# Patient Record
Sex: FEMALE | Race: WHITE | NOT HISPANIC OR LATINO | URBAN - METROPOLITAN AREA
[De-identification: names, ages, dates, MRNs, and addresses within clinical notes are randomized per-mention and may not be internally consistent; named-entity substitution may affect disease eponyms.]

---

## 2018-04-02 ENCOUNTER — EMERGENCY (EMERGENCY)
Facility: HOSPITAL | Age: 23
LOS: 0 days | Discharge: HOME | End: 2018-04-02
Attending: EMERGENCY MEDICINE

## 2018-04-02 VITALS
RESPIRATION RATE: 17 BRPM | TEMPERATURE: 97 F | DIASTOLIC BLOOD PRESSURE: 92 MMHG | HEART RATE: 85 BPM | OXYGEN SATURATION: 99 % | SYSTOLIC BLOOD PRESSURE: 136 MMHG

## 2018-04-02 VITALS
HEART RATE: 100 BPM | SYSTOLIC BLOOD PRESSURE: 142 MMHG | RESPIRATION RATE: 20 BRPM | OXYGEN SATURATION: 98 % | TEMPERATURE: 97 F | DIASTOLIC BLOOD PRESSURE: 92 MMHG

## 2018-04-02 DIAGNOSIS — R07.89 OTHER CHEST PAIN: ICD-10-CM

## 2018-04-02 DIAGNOSIS — R06.02 SHORTNESS OF BREATH: ICD-10-CM

## 2018-04-02 DIAGNOSIS — R53.1 WEAKNESS: ICD-10-CM

## 2018-04-02 DIAGNOSIS — I10 ESSENTIAL (PRIMARY) HYPERTENSION: ICD-10-CM

## 2018-04-02 LAB
ALBUMIN SERPL ELPH-MCNC: 4.6 G/DL — SIGNIFICANT CHANGE UP (ref 3.5–5.2)
ALP SERPL-CCNC: 59 U/L — SIGNIFICANT CHANGE UP (ref 30–115)
ALT FLD-CCNC: 35 U/L — SIGNIFICANT CHANGE UP (ref 0–41)
ANION GAP SERPL CALC-SCNC: 16 MMOL/L — HIGH (ref 7–14)
APPEARANCE UR: (no result)
AST SERPL-CCNC: 32 U/L — SIGNIFICANT CHANGE UP (ref 0–41)
BACTERIA # UR AUTO: (no result) /HPF
BASE EXCESS BLDV CALC-SCNC: -0.5 MMOL/L — SIGNIFICANT CHANGE UP (ref -2–2)
BASOPHILS # BLD AUTO: 0.02 K/UL — SIGNIFICANT CHANGE UP (ref 0–0.2)
BASOPHILS NFR BLD AUTO: 0.2 % — SIGNIFICANT CHANGE UP (ref 0–1)
BILIRUB SERPL-MCNC: 0.7 MG/DL — SIGNIFICANT CHANGE UP (ref 0.2–1.2)
BILIRUB UR-MCNC: NEGATIVE — SIGNIFICANT CHANGE UP
BUN SERPL-MCNC: 6 MG/DL — LOW (ref 10–20)
CA-I SERPL-SCNC: 1.35 MMOL/L — HIGH (ref 1.12–1.3)
CALCIUM SERPL-MCNC: 10.4 MG/DL — HIGH (ref 8.5–10.1)
CHLORIDE SERPL-SCNC: 99 MMOL/L — SIGNIFICANT CHANGE UP (ref 98–110)
CO2 SERPL-SCNC: 21 MMOL/L — SIGNIFICANT CHANGE UP (ref 17–32)
COLOR SPEC: YELLOW — SIGNIFICANT CHANGE UP
CREAT SERPL-MCNC: 0.7 MG/DL — SIGNIFICANT CHANGE UP (ref 0.7–1.5)
D DIMER BLD IA.RAPID-MCNC: 130 NG/ML DDU — SIGNIFICANT CHANGE UP (ref 0–230)
DIFF PNL FLD: NEGATIVE — SIGNIFICANT CHANGE UP
EOSINOPHIL # BLD AUTO: 0.21 K/UL — SIGNIFICANT CHANGE UP (ref 0–0.7)
EOSINOPHIL NFR BLD AUTO: 1.6 % — SIGNIFICANT CHANGE UP (ref 0–8)
EPI CELLS # UR: (no result) /HPF
GAS PNL BLDV: 137 MMOL/L — SIGNIFICANT CHANGE UP (ref 136–145)
GAS PNL BLDV: SIGNIFICANT CHANGE UP
GLUCOSE SERPL-MCNC: 72 MG/DL — SIGNIFICANT CHANGE UP (ref 70–99)
GLUCOSE UR QL: NEGATIVE MG/DL — SIGNIFICANT CHANGE UP
HCG SERPL-ACNC: HIGH MIU/ML (ref 0–5)
HCO3 BLDV-SCNC: 24 MMOL/L — SIGNIFICANT CHANGE UP (ref 22–29)
HCT VFR BLD CALC: 40.3 % — SIGNIFICANT CHANGE UP (ref 37–47)
HCT VFR BLDA CALC: 43.2 % — SIGNIFICANT CHANGE UP (ref 34–44)
HGB BLD CALC-MCNC: 14.1 G/DL — SIGNIFICANT CHANGE UP (ref 14–18)
HGB BLD-MCNC: 13.3 G/DL — SIGNIFICANT CHANGE UP (ref 12–16)
IMM GRANULOCYTES NFR BLD AUTO: 0.6 % — HIGH (ref 0.1–0.3)
KETONES UR-MCNC: 40
LACTATE BLDV-MCNC: 0.8 MMOL/L — SIGNIFICANT CHANGE UP (ref 0.5–1.6)
LEUKOCYTE ESTERASE UR-ACNC: (no result)
LYMPHOCYTES # BLD AUTO: 17.9 % — LOW (ref 20.5–51.1)
LYMPHOCYTES # BLD AUTO: 2.29 K/UL — SIGNIFICANT CHANGE UP (ref 1.2–3.4)
MAGNESIUM SERPL-MCNC: 1.9 MG/DL — SIGNIFICANT CHANGE UP (ref 1.8–2.4)
MCHC RBC-ENTMCNC: 28.1 PG — SIGNIFICANT CHANGE UP (ref 27–31)
MCHC RBC-ENTMCNC: 33 G/DL — SIGNIFICANT CHANGE UP (ref 32–37)
MCV RBC AUTO: 85.2 FL — SIGNIFICANT CHANGE UP (ref 81–99)
MONOCYTES # BLD AUTO: 0.65 K/UL — HIGH (ref 0.1–0.6)
MONOCYTES NFR BLD AUTO: 5.1 % — SIGNIFICANT CHANGE UP (ref 1.7–9.3)
NEUTROPHILS # BLD AUTO: 9.53 K/UL — HIGH (ref 1.4–6.5)
NEUTROPHILS NFR BLD AUTO: 74.6 % — SIGNIFICANT CHANGE UP (ref 42.2–75.2)
NITRITE UR-MCNC: NEGATIVE — SIGNIFICANT CHANGE UP
NRBC # BLD: 0 /100 WBCS — SIGNIFICANT CHANGE UP (ref 0–0)
PCO2 BLDV: 38 MMHG — LOW (ref 41–51)
PH BLDV: 7.41 — SIGNIFICANT CHANGE UP (ref 7.26–7.43)
PH UR: 6 — SIGNIFICANT CHANGE UP (ref 5–8)
PLATELET # BLD AUTO: 254 K/UL — SIGNIFICANT CHANGE UP (ref 130–400)
PO2 BLDV: 36 MMHG — SIGNIFICANT CHANGE UP (ref 20–40)
POTASSIUM BLDV-SCNC: 3.7 MMOL/L — SIGNIFICANT CHANGE UP (ref 3.3–5.6)
POTASSIUM SERPL-MCNC: 4 MMOL/L — SIGNIFICANT CHANGE UP (ref 3.5–5)
POTASSIUM SERPL-SCNC: 4 MMOL/L — SIGNIFICANT CHANGE UP (ref 3.5–5)
PROT SERPL-MCNC: 7.4 G/DL — SIGNIFICANT CHANGE UP (ref 6–8)
PROT UR-MCNC: NEGATIVE MG/DL — SIGNIFICANT CHANGE UP
RBC # BLD: 4.73 M/UL — SIGNIFICANT CHANGE UP (ref 4.2–5.4)
RBC # FLD: 13.7 % — SIGNIFICANT CHANGE UP (ref 11.5–14.5)
SAO2 % BLDV: 70 % — SIGNIFICANT CHANGE UP
SODIUM SERPL-SCNC: 136 MMOL/L — SIGNIFICANT CHANGE UP (ref 135–146)
SP GR SPEC: 1.01 — SIGNIFICANT CHANGE UP (ref 1.01–1.03)
TROPONIN T SERPL-MCNC: <0.01 NG/ML — SIGNIFICANT CHANGE UP
UROBILINOGEN FLD QL: 0.2 MG/DL — SIGNIFICANT CHANGE UP (ref 0.2–0.2)
WBC # BLD: 12.78 K/UL — HIGH (ref 4.8–10.8)
WBC # FLD AUTO: 12.78 K/UL — HIGH (ref 4.8–10.8)
WBC UR QL: (no result) /HPF

## 2018-04-02 RX ORDER — ONDANSETRON 8 MG/1
8 TABLET, FILM COATED ORAL ONCE
Qty: 0 | Refills: 0 | Status: COMPLETED | OUTPATIENT
Start: 2018-04-02 | End: 2018-04-02

## 2018-04-02 RX ORDER — LABETALOL HCL 100 MG
1 TABLET ORAL
Qty: 60 | Refills: 0 | OUTPATIENT
Start: 2018-04-02 | End: 2018-05-01

## 2018-04-02 RX ORDER — SODIUM CHLORIDE 9 MG/ML
1000 INJECTION, SOLUTION INTRAVENOUS ONCE
Qty: 0 | Refills: 0 | Status: COMPLETED | OUTPATIENT
Start: 2018-04-02 | End: 2018-04-02

## 2018-04-02 RX ORDER — LABETALOL HCL 100 MG
100 TABLET ORAL ONCE
Qty: 0 | Refills: 0 | Status: COMPLETED | OUTPATIENT
Start: 2018-04-02 | End: 2018-04-02

## 2018-04-02 RX ADMIN — ONDANSETRON 8 MILLIGRAM(S): 8 TABLET, FILM COATED ORAL at 15:32

## 2018-04-02 RX ADMIN — SODIUM CHLORIDE 3000 MILLILITER(S): 9 INJECTION, SOLUTION INTRAVENOUS at 15:32

## 2018-04-02 RX ADMIN — Medication 100 MILLIGRAM(S): at 18:19

## 2018-04-02 NOTE — ED ADULT NURSE NOTE - OBJECTIVE STATEMENT
pt recently diagnosed with htn, not yet on medication. 14 weeks pregnant. c/o chest discomfort, sob, nausea, dizziness this morning, not feeling a little better. pt reports bp 215/105 at home

## 2018-04-02 NOTE — ED PROVIDER NOTE - OBJECTIVE STATEMENT
21 y/o F PMH of HTN (currently being worked up by her GYN), , 14 weeks pregnant, presents to hospital for episode of dizziness and palpitations this morning. Pt checked her BP at this time which was reportedly 201/115. Pt also reports associated nausea, 1 episode of NBNB vomiting, and SOB. SX now resolved in ED. Pt states this also happened to her last week. Denies HX of DVT’s and family HX of pregnancy complications. No abdominal pain or vaginal bleeding. No leg swelling. GYN Dr. Miller.

## 2018-04-02 NOTE — ED PROVIDER NOTE - PHYSICAL EXAMINATION
VITAL SIGNS: I have reviewed nursing notes and confirm.  CONSTITUTIONAL: Alert, NAD, WDWN, NCAT, non-toxic appearing.  SKIN: Warm/dry, no rash.  HEAD: NCAT.   EYES: PERRL, EOMI, normal pupils, no icterus.  ENT: Normal external ENT, pink/moist membranes, pharynx normal, airway intact.  NECK: Supple; non tender.  CARD: CVS1S2, RRR, no m/g/r, no JVD, 2+ pulses b/l.   RESP: Normal respiratory effort, speaking full sentences, lungs CTA b/l.  ABD: Soft, no tender/dist/guard/rebound, no CVA tenderness.  EXT: FROM all 4 ext, no alonzo tender/deform.  LYMPH: No acute cervical adenopathy.  NEURO: AAOx3, CN 2-12 intact, normal motor, normal sensory, normal gait.  PSYCH: Cooperative, appropriate.

## 2018-04-02 NOTE — ED ADULT TRIAGE NOTE - CHIEF COMPLAINT QUOTE
Pt with on and off of episode od dizziness hear palpitation weakness from today in AM ,pt 14 weeks pregnant .

## 2018-04-02 NOTE — ED PROVIDER NOTE - NS ED ROS FT
Constitutional: No fever/chills.  Eyes: No visual changes, eye pain or discharge.  ENMT: No hearing changes, pain, discharge or infections.  Cardiac: (+) Chest pain. (+) SOB. No edema.  Respiratory: No cough or respiratory distress.   GI: (+) Vomiting. No diarrhea or abdominal pain.  : No dysuria, frequency or burning.  MS: No myalgia, muscle weakness, joint pain or back pain.  Neuro: (+) Dizziness. No headache or weakness. No LOC.  Skin: No skin rash.   Except as documented in the HPI, all other systems are negative.

## 2018-04-02 NOTE — ED PROVIDER NOTE - CARE PLAN
Principal Discharge DX:	Hypertension, unspecified type  Secondary Diagnosis:	Chest pain, unspecified type

## 2018-08-30 ENCOUNTER — OUTPATIENT (OUTPATIENT)
Dept: OUTPATIENT SERVICES | Facility: HOSPITAL | Age: 23
LOS: 1 days | Discharge: HOME | End: 2018-08-30

## 2018-08-30 VITALS — DIASTOLIC BLOOD PRESSURE: 95 MMHG | SYSTOLIC BLOOD PRESSURE: 129 MMHG | HEART RATE: 85 BPM

## 2018-08-30 VITALS — HEART RATE: 96 BPM | DIASTOLIC BLOOD PRESSURE: 92 MMHG | SYSTOLIC BLOOD PRESSURE: 130 MMHG

## 2018-08-30 NOTE — OB PROVIDER TRIAGE NOTE - NSHPPHYSICALEXAM_GEN_ALL_CORE
Vital Signs Last 24 Hrs  T(F): 98.6 (30 Aug 2018 09:24), Max: 98.6 (30 Aug 2018 09:24)  HR: 88 (30 Aug 2018 10:19) (88 - 96)  BP: 126/81 (30 Aug 2018 10:19) (126/81 - 133/94)  RR: 18 (30 Aug 2018 09:24) (18 - 18)    udip neg  Gen: NAD  CVS: RRR. normal S1, S2  Lungs: CTAB  Abd: soft, non tender, no R/G/R, no epigastric or RUQ tenderness  Neuro: reflexes 2+ in upper extremities B/L  LE: 1+ edema  SVE closed  neg nitrazine, ferning pooling  /mod/+accels  [place] none

## 2018-08-30 NOTE — OB PROVIDER TRIAGE NOTE - NSOBPROVIDERNOTE_OBGYN_ALL_OB_FT
23 yo  at 34w6d, CHTN on labetalol, not ruptured, not in labor. Fetal at maternal status reassuring. 21 yo  at 34w6d, CHTN on labetalol, not ruptured, not in labor. Fetal at maternal status reassuring.  -Discharge home  -PTL precuations given  -PEL precautions given  -[facility] instructed,   -f/u Appt on     Dr. Larkin and Dr. Miller aware

## 2018-08-30 NOTE — OB PROVIDER TRIAGE NOTE - ADDITIONAL INSTRUCTIONS
-Discharge home  -PTL precuations given  -PEL precautions given  -[facility] instructed,   -f/u Appt on 9/5

## 2018-08-30 NOTE — OB PROVIDER TRIAGE NOTE - HISTORY OF PRESENT ILLNESS
23 yo  at 34w6d with ARMANDO 10/5 by LMP c/w first trimester sono presents with LOF at 0700, clear. Reports she had 2 more small gushes, clear, unsure if urine. Not currently leaking. Reports camping, no painful ctx, VB. Good FM. CHTN discovered early this pregnancy, on labetalol 100mg BID since then, no changes to medications. BPs at home 120-130s/70s-80s. Denies headaache, vision changes, CP, SOb, abdominal pain, LE swelling. Also on aspirin 81mg x2 at bedtime. Denies other complications during this pregnancy. Last intercourse 3 days ago, Last BM this morning, normal. Denies fevers, chills, headache, CP, SOB, abd pain, dysuria.     Meds last taken last night, no labetalol taken this AM.    obhx: primigravid  Gyn: L ovarian cyst, no intervention. Otherwise denies GYn hx

## 2018-09-19 PROBLEM — I10 ESSENTIAL (PRIMARY) HYPERTENSION: Chronic | Status: ACTIVE | Noted: 2018-08-30

## 2018-09-19 PROBLEM — Z00.00 ENCOUNTER FOR PREVENTIVE HEALTH EXAMINATION: Status: ACTIVE | Noted: 2018-09-19

## 2018-09-21 ENCOUNTER — APPOINTMENT (OUTPATIENT)
Dept: ANTEPARTUM | Facility: CLINIC | Age: 23
End: 2018-09-21

## 2018-09-21 ENCOUNTER — RESULT CHARGE (OUTPATIENT)
Age: 23
End: 2018-09-21

## 2018-09-21 ENCOUNTER — OUTPATIENT (OUTPATIENT)
Dept: OUTPATIENT SERVICES | Facility: HOSPITAL | Age: 23
LOS: 1 days | Discharge: HOME | End: 2018-09-21

## 2018-09-21 VITALS
HEIGHT: 67 IN | DIASTOLIC BLOOD PRESSURE: 85 MMHG | HEART RATE: 93 BPM | SYSTOLIC BLOOD PRESSURE: 132 MMHG | WEIGHT: 239.6 LBS | BODY MASS INDEX: 37.61 KG/M2 | TEMPERATURE: 98.4 F | OXYGEN SATURATION: 97 %

## 2018-09-21 DIAGNOSIS — Z86.79 PERSONAL HISTORY OF OTHER DISEASES OF THE CIRCULATORY SYSTEM: ICD-10-CM

## 2018-09-21 DIAGNOSIS — Z78.9 OTHER SPECIFIED HEALTH STATUS: ICD-10-CM

## 2018-09-21 DIAGNOSIS — O10.919 UNSPECIFIED PRE-EXISTING HYPERTENSION COMPLICATING PREGNANCY, UNSPECIFIED TRIMESTER: ICD-10-CM

## 2018-09-21 DIAGNOSIS — Z34.90 ENCOUNTER FOR SUPERVISION OF NORMAL PREGNANCY, UNSPECIFIED, UNSPECIFIED TRIMESTER: ICD-10-CM

## 2018-09-21 LAB
BILIRUB UR QL STRIP: NORMAL
CLARITY UR: CLEAR
COLLECTION METHOD: NORMAL
FETAL MOVEMENT: PRESENT
GLUCOSE UR-MCNC: NORMAL
HCG UR QL: 0.2 EU/DL
HGB UR QL STRIP.AUTO: NORMAL
KETONES UR-MCNC: NORMAL
LEUKOCYTE ESTERASE UR QL STRIP: NORMAL
NITRITE UR QL STRIP: NORMAL
OB COMMENTS: NORMAL
PH UR STRIP: 6
PROT UR STRIP-MCNC: NORMAL
SCHEDULED VISIT: YES
SP GR UR STRIP: 1.01
URINE ALBUMIN/PROTEIN: NORMAL
URINE GLUCOSE: NORMAL
URINE KETONES: NORMAL

## 2018-09-21 RX ORDER — LABETALOL HYDROCHLORIDE 100 MG/1
100 TABLET, FILM COATED ORAL
Refills: 0 | Status: ACTIVE | COMMUNITY

## 2018-09-24 ENCOUNTER — INPATIENT (INPATIENT)
Facility: HOSPITAL | Age: 23
LOS: 3 days | Discharge: HOME | End: 2018-09-28
Attending: OBSTETRICS & GYNECOLOGY | Admitting: OBSTETRICS & GYNECOLOGY

## 2018-09-24 VITALS — HEART RATE: 88 BPM | SYSTOLIC BLOOD PRESSURE: 135 MMHG | DIASTOLIC BLOOD PRESSURE: 87 MMHG | TEMPERATURE: 98 F

## 2018-09-24 LAB
ALBUMIN SERPL ELPH-MCNC: 3.8 G/DL — SIGNIFICANT CHANGE UP (ref 3.5–5.2)
ALP SERPL-CCNC: 137 U/L — HIGH (ref 30–115)
ALT FLD-CCNC: 13 U/L — SIGNIFICANT CHANGE UP (ref 0–41)
AMPHET UR-MCNC: NEGATIVE — SIGNIFICANT CHANGE UP
ANION GAP SERPL CALC-SCNC: 17 MMOL/L — HIGH (ref 7–14)
APPEARANCE UR: CLEAR — SIGNIFICANT CHANGE UP
AST SERPL-CCNC: 19 U/L — SIGNIFICANT CHANGE UP (ref 0–41)
BARBITURATES UR SCN-MCNC: NEGATIVE — SIGNIFICANT CHANGE UP
BASOPHILS # BLD AUTO: 0.02 K/UL — SIGNIFICANT CHANGE UP (ref 0–0.2)
BASOPHILS NFR BLD AUTO: 0.2 % — SIGNIFICANT CHANGE UP (ref 0–1)
BENZODIAZ UR-MCNC: NEGATIVE — SIGNIFICANT CHANGE UP
BILIRUB SERPL-MCNC: 0.6 MG/DL — SIGNIFICANT CHANGE UP (ref 0.2–1.2)
BILIRUB UR-MCNC: NEGATIVE — SIGNIFICANT CHANGE UP
BLD GP AB SCN SERPL QL: SIGNIFICANT CHANGE UP
BUN SERPL-MCNC: 10 MG/DL — SIGNIFICANT CHANGE UP (ref 10–20)
CALCIUM SERPL-MCNC: 10.5 MG/DL — HIGH (ref 8.5–10.1)
CHLORIDE SERPL-SCNC: 99 MMOL/L — SIGNIFICANT CHANGE UP (ref 98–110)
CO2 SERPL-SCNC: 21 MMOL/L — SIGNIFICANT CHANGE UP (ref 17–32)
COCAINE METAB.OTHER UR-MCNC: NEGATIVE — SIGNIFICANT CHANGE UP
COLOR SPEC: YELLOW — SIGNIFICANT CHANGE UP
CREAT ?TM UR-MCNC: 94 MG/DL — SIGNIFICANT CHANGE UP
CREAT SERPL-MCNC: 0.7 MG/DL — SIGNIFICANT CHANGE UP (ref 0.7–1.5)
DIFF PNL FLD: NEGATIVE — SIGNIFICANT CHANGE UP
EOSINOPHIL # BLD AUTO: 0.1 K/UL — SIGNIFICANT CHANGE UP (ref 0–0.7)
EOSINOPHIL NFR BLD AUTO: 1 % — SIGNIFICANT CHANGE UP (ref 0–8)
GLUCOSE SERPL-MCNC: 58 MG/DL — LOW (ref 70–99)
GLUCOSE UR QL: NEGATIVE MG/DL — SIGNIFICANT CHANGE UP
HCT VFR BLD CALC: 32 % — LOW (ref 37–47)
HGB BLD-MCNC: 10.7 G/DL — LOW (ref 12–16)
HIV 1 & 2 AB SERPL IA.RAPID: SIGNIFICANT CHANGE UP
IMM GRANULOCYTES NFR BLD AUTO: 0.3 % — SIGNIFICANT CHANGE UP (ref 0.1–0.3)
KETONES UR-MCNC: NEGATIVE — SIGNIFICANT CHANGE UP
LDH SERPL L TO P-CCNC: 175 — SIGNIFICANT CHANGE UP (ref 50–242)
LEUKOCYTE ESTERASE UR-ACNC: NEGATIVE — SIGNIFICANT CHANGE UP
LYMPHOCYTES # BLD AUTO: 2.12 K/UL — SIGNIFICANT CHANGE UP (ref 1.2–3.4)
LYMPHOCYTES # BLD AUTO: 21.5 % — SIGNIFICANT CHANGE UP (ref 20.5–51.1)
MCHC RBC-ENTMCNC: 29.5 PG — SIGNIFICANT CHANGE UP (ref 27–31)
MCHC RBC-ENTMCNC: 33.4 G/DL — SIGNIFICANT CHANGE UP (ref 32–37)
MCV RBC AUTO: 88.2 FL — SIGNIFICANT CHANGE UP (ref 81–99)
METHADONE UR-MCNC: NEGATIVE — SIGNIFICANT CHANGE UP
MONOCYTES # BLD AUTO: 0.71 K/UL — HIGH (ref 0.1–0.6)
MONOCYTES NFR BLD AUTO: 7.2 % — SIGNIFICANT CHANGE UP (ref 1.7–9.3)
NEUTROPHILS # BLD AUTO: 6.86 K/UL — HIGH (ref 1.4–6.5)
NEUTROPHILS NFR BLD AUTO: 69.8 % — SIGNIFICANT CHANGE UP (ref 42.2–75.2)
NITRITE UR-MCNC: NEGATIVE — SIGNIFICANT CHANGE UP
NRBC # BLD: 0 /100 WBCS — SIGNIFICANT CHANGE UP (ref 0–0)
OPIATES UR-MCNC: NEGATIVE — SIGNIFICANT CHANGE UP
PCP SPEC-MCNC: SIGNIFICANT CHANGE UP
PH UR: 6 — SIGNIFICANT CHANGE UP (ref 5–8)
PLATELET # BLD AUTO: 184 K/UL — SIGNIFICANT CHANGE UP (ref 130–400)
POTASSIUM SERPL-MCNC: 4.4 MMOL/L — SIGNIFICANT CHANGE UP (ref 3.5–5)
POTASSIUM SERPL-SCNC: 4.4 MMOL/L — SIGNIFICANT CHANGE UP (ref 3.5–5)
PRENATAL SYPHILIS TEST: SIGNIFICANT CHANGE UP
PROPOXYPHENE QUALITATIVE URINE RESULT: NEGATIVE — SIGNIFICANT CHANGE UP
PROT ?TM UR-MCNC: 12 MG/DLG/24H — SIGNIFICANT CHANGE UP
PROT SERPL-MCNC: 6.5 G/DL — SIGNIFICANT CHANGE UP (ref 6–8)
PROT UR-MCNC: NEGATIVE MG/DL — SIGNIFICANT CHANGE UP
PROT/CREAT UR-RTO: 0.1 RATIO — SIGNIFICANT CHANGE UP (ref 0–0.2)
RBC # BLD: 3.63 M/UL — LOW (ref 4.2–5.4)
RBC # FLD: 14 % — SIGNIFICANT CHANGE UP (ref 11.5–14.5)
SODIUM SERPL-SCNC: 137 MMOL/L — SIGNIFICANT CHANGE UP (ref 135–146)
SP GR SPEC: 1.01 — SIGNIFICANT CHANGE UP (ref 1.01–1.03)
TYPE + AB SCN PNL BLD: SIGNIFICANT CHANGE UP
URATE SERPL-MCNC: 6.4 MG/DL — SIGNIFICANT CHANGE UP (ref 2.5–7)
UROBILINOGEN FLD QL: 0.2 MG/DL — SIGNIFICANT CHANGE UP (ref 0.2–0.2)
WBC # BLD: 9.84 K/UL — SIGNIFICANT CHANGE UP (ref 4.8–10.8)
WBC # FLD AUTO: 9.84 K/UL — SIGNIFICANT CHANGE UP (ref 4.8–10.8)

## 2018-09-24 RX ORDER — DINOPROSTONE 10 MG/241MG
10 INSERT VAGINAL ONCE
Qty: 0 | Refills: 0 | Status: COMPLETED | OUTPATIENT
Start: 2018-09-24 | End: 2018-09-24

## 2018-09-24 RX ORDER — SODIUM CHLORIDE 9 MG/ML
125 INJECTION, SOLUTION INTRAVENOUS ONCE
Qty: 0 | Refills: 0 | Status: DISCONTINUED | OUTPATIENT
Start: 2018-09-24 | End: 2018-09-26

## 2018-09-24 RX ORDER — OXYTOCIN 10 UNIT/ML
41.67 VIAL (ML) INJECTION
Qty: 20 | Refills: 0 | Status: DISCONTINUED | OUTPATIENT
Start: 2018-09-24 | End: 2018-09-26

## 2018-09-24 RX ORDER — SODIUM CHLORIDE 9 MG/ML
1000 INJECTION, SOLUTION INTRAVENOUS
Qty: 0 | Refills: 0 | Status: DISCONTINUED | OUTPATIENT
Start: 2018-09-24 | End: 2018-09-26

## 2018-09-24 RX ORDER — LABETALOL HCL 100 MG
100 TABLET ORAL
Qty: 0 | Refills: 0 | Status: DISCONTINUED | OUTPATIENT
Start: 2018-09-24 | End: 2018-09-28

## 2018-09-24 RX ADMIN — Medication 100 MILLIGRAM(S): at 22:09

## 2018-09-24 RX ADMIN — DINOPROSTONE 10 MILLIGRAM(S): 10 INSERT VAGINAL at 19:14

## 2018-09-24 NOTE — OB PROVIDER H&P - NS_VBACATTEMPT_OBGYN_ALL_OB
Pt arrives with c/o new onset seizures that were witnessed. Pt is not post ictal at this time.       Hortencia Burris RN  06/03/18 1116     N/A

## 2018-09-24 NOTE — OB PROVIDER H&P - ASSESSMENT
23 yo  38w3d, GBS neg per patient, IOL for chronic hypertension.     -Admit  -Admission labs  -IVF  -Pain management prn  -Continuous EFM/toco  -Monitor BPs q15min  -PEL    Dr. Larkin and Dr. Miller to be made aware. 21 yo  38w3d, GBS neg per patient, IOL for chronic hypertension with cervidil.     -Cervidil  -Admit  -Admission labs  -IVF  -Pain management prn  -Continuous EFM/toco  -Monitor BPs q15min  -PEL    Dr. Larkin and Dr. Miller to be made aware.

## 2018-09-24 NOTE — PROGRESS NOTE ADULT - SUBJECTIVE AND OBJECTIVE BOX
PGY2 Note    Patient seen at bedside for evaluation of labor progression, with Cervidil in at 1915, doing well, no complaints.     T(F): 98.8 ( @ 17:54), Max: 98.8 ( @ 17:54)  HR: 97 ( @ 23:01)  BP: 138/68 ( @ 23:01) (124/65 - 150/79)  RR: 18 ( @ 17:54)  EFM: 120/mod/+accels  TOCO: q 1-4min irregular  SVE: c/l/p , Intact    Medications:  dinoprostone Insert: 10 ( @ 19:14)  labetalol: 100 ( @ 22:09)      Labs:                        10.7   9.84  )-----------( 184      ( 24 Sep 2018 18:24 )             32.0         137  |  99  |  10  ----------------------------<  58<L>  4.4   |  21  |  0.7    Ca    10.5<H>      24 Sep 2018 18:24    TPro  6.5  /  Alb  3.8  /  TBili  0.6  /  DBili  x   /  AST  19  /  ALT  13  /  AlkPhos  137<H>      Prenatal Syphilis Test: Nonreact ( @ 18:24)  Rapid HIV-1/2 Antibody: Nonreact ( @ 18:24)  Drug Screen W/PCP, Urine: Done ( @ 17:58)  Uric Acid, Serum: 6.4 mg/dL ( @ 18:24)  Type + Screen: A POS (18 @ 18:24)  Antibody Screen: NEG (18 @ 18:24)    Urinalysis Basic - ( 24 Sep 2018 17:58 )    Color: Yellow / Appearance: Clear / S.015 / pH: x  Gluc: x / Ketone: Negative  / Bili: Negative / Urobili: 0.2 mg/dL   Blood: x / Protein: Negative mg/dL / Nitrite: Negative   Leuk Esterase: Negative / RBC: x / WBC x   Sq Epi: x / Non Sq Epi: x / Bacteria: x PGY2 Note    Patient seen at bedside for evaluation of labor progression, with Cervidil in at 1915, doing well, no complaints.     T(F): 98.8 ( @ 17:54), Max: 98.8 ( @ 17:54)  HR: 97 ( @ 23:01)  BP: 138/68 ( @ 23:01) (124/65 - 150/79)  RR: 18 ( @ 17:54)  EFM: 120/mod/+accels  TOCO: q 1-4min irregular  SVE:Deferred. Last exam: c/l/p , Intact @1750    Medications:  dinoprostone Insert: 10 ( @ 19:14)  labetalol: 100 ( @ 22:09)      Labs:                        10.7   9.84  )-----------( 184      ( 24 Sep 2018 18:24 )             32.0         137  |  99  |  10  ----------------------------<  58<L>  4.4   |  21  |  0.7    Ca    10.5<H>      24 Sep 2018 18:24    TPro  6.5  /  Alb  3.8  /  TBili  0.6  /  DBili  x   /  AST  19  /  ALT  13  /  AlkPhos  137<H>      Prenatal Syphilis Test: Nonreact ( @ 18:24)  Rapid HIV-1/2 Antibody: Nonreact ( @ 18:24)  Drug Screen W/PCP, Urine: Done ( @ 17:58)  Uric Acid, Serum: 6.4 mg/dL ( @ 18:24)  Type + Screen: A POS (18 @ 18:24)  Antibody Screen: NEG (18 @ 18:24)    Urinalysis Basic - ( 24 Sep 2018 17:58 )    Color: Yellow / Appearance: Clear / S.015 / pH: x  Gluc: x / Ketone: Negative  / Bili: Negative / Urobili: 0.2 mg/dL   Blood: x / Protein: Negative mg/dL / Nitrite: Negative   Leuk Esterase: Negative / RBC: x / WBC x   Sq Epi: x / Non Sq Epi: x / Bacteria: x

## 2018-09-24 NOTE — OB PROVIDER H&P - NSHPPHYSICALEXAM_GEN_ALL_CORE
Vital Signs Last 24 Hrs  T(C): 37.1 (24 Sep 2018 17:54), Max: 37.1 (24 Sep 2018 17:54)  T(F): 98.8 (24 Sep 2018 17:54), Max: 98.8 (24 Sep 2018 17:54)  HR: 83 (24 Sep 2018 18:05) (83 - 88)  BP: 135/87 (24 Sep 2018 18:05) (135/87 - 135/87)  RR: 18 (24 Sep 2018 17:54) (18 - 18)    Udip: neg  EFM: 125/mod/accel +  [place]: irregular  SVE: deferred, last one at the office today, C/L/P and vertex by sono  Abd: NT, gravid, no palpable contractions

## 2018-09-24 NOTE — OB PROVIDER H&P - HISTORY OF PRESENT ILLNESS
23 yo  38w3d w/ ARMANDO of 10/05/2018 by LMP consistent with 1st trimester sono here for IOL for gestational hypertension. Denies contractions, LOF and VB. Feels good FM. No complications in this pregnancy other than gestational hypertension. Last PMD visit was today and her cervical exam was C/L/P. Pt takes labetalol 100 mg bid for her hypertensive disorder, BPs well controlled with it but PMD was concerned that BPs were rising towards the end of the pregnancy. 23 yo  38w3d w/ ARMANDO of 10/05/2018 by LMP consistent with 1st trimester sono here for IOL for chronic hypertension. Denies contractions, LOF and VB. Feels good FM. No complications in this pregnancy other than gestational hypertension. Last PMD visit was today and her cervical exam was C/L/P. Pt takes labetalol 100 mg bid for her hypertensive disorder, BPs well controlled with it but PMD was concerned that BPs were rising towards the end of the pregnancy. 21 yo  38w3d w/ ARMANDO of 10/05/2018 by LMP consistent with 1st trimester sono here for IOL for chronic hypertension. Denies contractions, LOF and VB. Feels good FM. No complications in this pregnancy other than chronic hypertension. Last PMD visit was today and her cervical exam was C/L/P. Pt takes labetalol 100 mg bid for her hypertensive disorder, BPs well controlled with it but PMD was concerned that BPs were rising towards the end of the pregnancy.

## 2018-09-24 NOTE — PROGRESS NOTE ADULT - ASSESSMENT
22y  at 38w3d, GBS neg, with cHTN on Labetolol 100mg BID, for IOL with Cervidil, doing well    -Continuous efm and toco  -F/u pending labs (UDS)  -IV hydration and clear liquid diet  -Pain management PRN  -Cervidil out at 0715    Dr. Doyle aware, Dr. Miller to be made aware

## 2018-09-24 NOTE — OB PROVIDER H&P - NSHPLABSRESULTS_GEN_ALL_CORE
GCT: 157  Sono:   36w4d: EFW 2997 grams (69%), MVP 5.23cm, cephalic, anterior placenta, umbilical artery S/D 71%ile GCT: 157  Sono:   36w4d: EFW 2997 grams (69%), MVP 5.23cm, cephalic, anterior placenta

## 2018-09-25 DIAGNOSIS — O09.93 SUPERVISION OF HIGH RISK PREGNANCY, UNSPECIFIED, THIRD TRIMESTER: ICD-10-CM

## 2018-09-25 DIAGNOSIS — O10.913 UNSPECIFIED PRE-EXISTING HYPERTENSION COMPLICATING PREGNANCY, THIRD TRIMESTER: ICD-10-CM

## 2018-09-25 DIAGNOSIS — Z3A.38 38 WEEKS GESTATION OF PREGNANCY: ICD-10-CM

## 2018-09-25 RX ORDER — DINOPROSTONE 10 MG/241MG
10 INSERT VAGINAL ONCE
Qty: 0 | Refills: 0 | Status: COMPLETED | OUTPATIENT
Start: 2018-09-25 | End: 2018-09-25

## 2018-09-25 RX ORDER — OXYTOCIN 10 UNIT/ML
2 VIAL (ML) INJECTION
Qty: 30 | Refills: 0 | Status: DISCONTINUED | OUTPATIENT
Start: 2018-09-25 | End: 2018-09-25

## 2018-09-25 RX ORDER — OXYTOCIN 10 UNIT/ML
2 VIAL (ML) INJECTION
Qty: 30 | Refills: 0 | Status: DISCONTINUED | OUTPATIENT
Start: 2018-09-25 | End: 2018-09-28

## 2018-09-25 RX ADMIN — DINOPROSTONE 10 MILLIGRAM(S): 10 INSERT VAGINAL at 11:35

## 2018-09-25 RX ADMIN — Medication 100 MILLIGRAM(S): at 06:00

## 2018-09-25 RX ADMIN — Medication 100 MILLIGRAM(S): at 17:59

## 2018-09-25 NOTE — PROGRESS NOTE ADULT - SUBJECTIVE AND OBJECTIVE BOX
PGY1 Note    Patient seen at bedside for evaluation doing well, no complaints    T(F): 98.24 ( @ 19:15), Max: 98.78 ( @ 03:17)  HR: 85 ( @ 20:31)  BP: 135/74 ( @ 20:31) (105/58 - 150/79)  RR: 18 ( @ 03:17)  EFM: 130/mod/+accels  TOCO: q 1-5min  SVE: Deferred. Last exam: /-2 @ 1117    Medications:    dinoprostone Insert: 10 ( @ 11:35)  dinoprostone Insert: 10 ( @ 19:14)  labetalol: 100 ( @ 17:59),  100 ( @ 06:00),  100 ( @ 22:09)      Labs:                        10.7   9.84  )-----------( 184      ( 24 Sep 2018 18:24 )             32.0         137  |  99  |  10  ----------------------------<  58<L>  4.4   |  21  |  0.7    Ca    10.5<H>      24 Sep 2018 18:24    TPro  6.5  /  Alb  3.8  /  TBili  0.6  /  DBili  x   /  AST  19  /  ALT  13  /  AlkPhos  137<H>        Urinalysis Basic - ( 24 Sep 2018 17:58 )    Color: Yellow / Appearance: Clear / S.015 / pH: x  Gluc: x / Ketone: Negative  / Bili: Negative / Urobili: 0.2 mg/dL   Blood: x / Protein: Negative mg/dL / Nitrite: Negative   Leuk Esterase: Negative / RBC: x / WBC x   Sq Epi: x / Non Sq Epi: x / Bacteria: x

## 2018-09-25 NOTE — PROGRESS NOTE ADULT - ASSESSMENT
22y.o.  @ 38.4wks Cervidil IOL for gHTN  - Pt may shower and have breakfast  - Continuous efm and toco  - Dr. Miller aware

## 2018-09-25 NOTE — PROGRESS NOTE ADULT - ASSESSMENT
22y  at 38w4d, GBS neg, with cHTN on Labetolol 100mg BID, for IOL with Cervidil x2, doing well    -Continuous efm and toco  -IV hydration and clear liquid diet  -Pain management PRN  -Cervidil out at 2333    Dr. Doyle aware, Dr. Miller to be made aware

## 2018-09-25 NOTE — PROGRESS NOTE ADULT - SUBJECTIVE AND OBJECTIVE BOX
PGY3 note    Pt seen and examined at bedside by PMD for labor progression, s/p cervidil.  Pt with no complaints at this time.    T(F): 98.78  HR: 82  BP: 106/58  SpO2: --    EFM: 130/mod/+accels  [place]erg[place]: none    SVE: /, vtx, intact per PMD    Meds:  labetalol 100 milliGRAM(s) two times a day  lactated ringers Bolus 125 milliLiter(s) once  lactated ringers. 1000 milliLiter(s) (125 mL/Hr) <Continuous>  oxytocin Infusion 41.667 milliUNIT(s)/Min (125 mL/Hr) <Continuous>      Labs:                        10.7   9.84  )-----------( 184      ( 18:24 )             32.0     Urinalysis Basic - ( 24 Sep 2018 17:58 )    Color: Yellow / Appearance: Clear / S.015 / pH: x  Gluc: x / Ketone: Negative  / Bili: Negative / Urobili: 0.2 mg/dL   Blood: x / Protein: Negative mg/dL / Nitrite: Negative   Leuk Esterase: Negative / RBC: x / WBC x   Sq Epi: x / Non Sq Epi: x / Bacteria: x      23 yo  38w3d GBS ngative, IOL for cHTN, on labetalol 100mg BID, s/p cervidilx1,  -continuous EMF/toco  -pain mgmt prn  -cervidil #2 for continued IOL    Dr Miller aware

## 2018-09-26 RX ORDER — OXYCODONE AND ACETAMINOPHEN 5; 325 MG/1; MG/1
1 TABLET ORAL
Qty: 0 | Refills: 0 | Status: DISCONTINUED | OUTPATIENT
Start: 2018-09-26 | End: 2018-09-28

## 2018-09-26 RX ORDER — CITRIC ACID/SODIUM CITRATE 300-500 MG
30 SOLUTION, ORAL ORAL ONCE
Qty: 0 | Refills: 0 | Status: COMPLETED | OUTPATIENT
Start: 2018-09-26 | End: 2018-09-26

## 2018-09-26 RX ORDER — SIMETHICONE 80 MG/1
80 TABLET, CHEWABLE ORAL EVERY 6 HOURS
Qty: 0 | Refills: 0 | Status: DISCONTINUED | OUTPATIENT
Start: 2018-09-26 | End: 2018-09-28

## 2018-09-26 RX ORDER — DOCUSATE SODIUM 100 MG
100 CAPSULE ORAL
Qty: 0 | Refills: 0 | Status: DISCONTINUED | OUTPATIENT
Start: 2018-09-26 | End: 2018-09-28

## 2018-09-26 RX ORDER — BENZOCAINE 10 %
1 GEL (GRAM) MUCOUS MEMBRANE EVERY 6 HOURS
Qty: 0 | Refills: 0 | Status: DISCONTINUED | OUTPATIENT
Start: 2018-09-26 | End: 2018-09-28

## 2018-09-26 RX ORDER — AER TRAVELER 0.5 G/1
1 SOLUTION RECTAL; TOPICAL EVERY 4 HOURS
Qty: 0 | Refills: 0 | Status: DISCONTINUED | OUTPATIENT
Start: 2018-09-26 | End: 2018-09-28

## 2018-09-26 RX ORDER — FENTANYL/BUPIVACAINE/NS/PF 2MCG/ML-.1
250 PLASTIC BAG, INJECTION (ML) INJECTION
Qty: 0 | Refills: 0 | Status: DISCONTINUED | OUTPATIENT
Start: 2018-09-26 | End: 2018-09-28

## 2018-09-26 RX ORDER — LANOLIN
1 OINTMENT (GRAM) TOPICAL EVERY 6 HOURS
Qty: 0 | Refills: 0 | Status: DISCONTINUED | OUTPATIENT
Start: 2018-09-26 | End: 2018-09-28

## 2018-09-26 RX ORDER — OXYTOCIN 10 UNIT/ML
41.67 VIAL (ML) INJECTION
Qty: 20 | Refills: 0 | Status: DISCONTINUED | OUTPATIENT
Start: 2018-09-26 | End: 2018-09-28

## 2018-09-26 RX ORDER — ACETAMINOPHEN 500 MG
650 TABLET ORAL EVERY 6 HOURS
Qty: 0 | Refills: 0 | Status: DISCONTINUED | OUTPATIENT
Start: 2018-09-26 | End: 2018-09-28

## 2018-09-26 RX ORDER — DIBUCAINE 1 %
1 OINTMENT (GRAM) RECTAL EVERY 4 HOURS
Qty: 0 | Refills: 0 | Status: DISCONTINUED | OUTPATIENT
Start: 2018-09-26 | End: 2018-09-28

## 2018-09-26 RX ORDER — IBUPROFEN 200 MG
600 TABLET ORAL EVERY 6 HOURS
Qty: 0 | Refills: 0 | Status: DISCONTINUED | OUTPATIENT
Start: 2018-09-26 | End: 2018-09-28

## 2018-09-26 RX ORDER — DIPHENHYDRAMINE HCL 50 MG
25 CAPSULE ORAL EVERY 6 HOURS
Qty: 0 | Refills: 0 | Status: DISCONTINUED | OUTPATIENT
Start: 2018-09-26 | End: 2018-09-28

## 2018-09-26 RX ORDER — MAGNESIUM HYDROXIDE 400 MG/1
30 TABLET, CHEWABLE ORAL
Qty: 0 | Refills: 0 | Status: DISCONTINUED | OUTPATIENT
Start: 2018-09-26 | End: 2018-09-28

## 2018-09-26 RX ORDER — GLYCERIN ADULT
1 SUPPOSITORY, RECTAL RECTAL AT BEDTIME
Qty: 0 | Refills: 0 | Status: DISCONTINUED | OUTPATIENT
Start: 2018-09-26 | End: 2018-09-28

## 2018-09-26 RX ADMIN — Medication 2 MILLIUNIT(S)/MIN: at 00:10

## 2018-09-26 RX ADMIN — Medication 100 MILLIGRAM(S): at 06:24

## 2018-09-26 RX ADMIN — Medication 100 MILLIGRAM(S): at 18:15

## 2018-09-26 RX ADMIN — Medication 600 MILLIGRAM(S): at 15:14

## 2018-09-26 RX ADMIN — Medication 30 MILLILITER(S): at 02:59

## 2018-09-26 NOTE — H&P NICU. - NS MD HP NEO PE EXTREM NORMAL
Movement patterns with normal strength and range of motion/Posture, length, shape, position symmetric and appropriate for age/Hips without evidence of dislocation on Zee & Ortalani maneuvers and by gluteal fold patterns

## 2018-09-26 NOTE — OB PROVIDER DELIVERY SUMMARY - NSPROVIDERDELIVERYNOTE_OBGYN_ALL_OB_FT
Patient was fully dilated and pushing. Fetal head was OA and restituted to LOT. The anterior and posterior shoulders delivered, followed by the remaining body atraumatically. Delayed cord clamping was performed, and then clamped and cut. Cord blood gases collected x2. The  was handed to the mother. The placenta delivered intact with membranes. Pitocin was administered. Uterus massaged, fundus found to be firm. Cervix, vagina and perineum inspected 2nd degree and labial lacerations were noted, repaired using 2-0 and 3-0 chromic in the usual fashion with good hemostasis.     Viable male infant delivered, weighing 3200 grams, with APGARs 9/9    Lacteration: 2nd degree and labial   EBL 300cc    Dr. Miller present for the delivery

## 2018-09-26 NOTE — H&P NICU. - NS MD HP NEO PE EYES NORMAL
Pupil red reflexes present and equal/Iris acceptable shape and color/Acceptable eye movement/Conjunctiva clear

## 2018-09-26 NOTE — PROGRESS NOTE ADULT - SUBJECTIVE AND OBJECTIVE BOX
PGY3 Note    Patient seen at bedside for evaluation of labor progression, doing well, no complaints. Sleeping comfortably.    Vital Signs Last 24 Hrs  T(C): 36.9 (26 Sep 2018 02:49), Max: 36.9 (26 Sep 2018 00:15)  T(F): 98.42 (26 Sep 2018 00:15), Max: 98.42 (26 Sep 2018 00:15)  HR: 83 (26 Sep 2018 04:58) (73 - 102)  BP: 97/51 (26 Sep 2018 04:58) (92/53 - 161/72)  EFM: 120/mod/+accels  TOCO: Q2-3mins  SVE: 2/60/-2, vtx, intact    Medications:  citric acid/sodium citrate Solution: 30 milliLiter(s) (18 @ 02:59)  dinoprostone Insert: 10 milliGRAM(s) (18 @ 11:35)  dinoprostone Insert: 10 milliGRAM(s) (18 @ 19:14)  labetalol: 100 milliGRAM(s) (18 @ 17:59),  100 milliGRAM(s) (18 @ 06:00),  100 milliGRAM(s) (18 @ 22:09)  oxytocin Infusion: 2 mL/Hr (18 @ 23:31)      Labs:                        10.7   9.84  )-----------( 184      ( 24 Sep 2018 18:24 )             32.0           137  |  99  |  10  ----------------------------<  58<L>  4.4   |  21  |  0.7    Ca    10.5<H>      24 Sep 2018 18:24    TPro  6.5  /  Alb  3.8  /  TBili  0.6  /  DBili  x   /  AST  19  /  ALT  13  /  AlkPhos  137<H>  -24      Urinalysis Basic - ( 24 Sep 2018 17:58 )    Color: Yellow / Appearance: Clear / S.015 / pH: x  Gluc: x / Ketone: Negative  / Bili: Negative / Urobili: 0.2 mg/dL   Blood: x / Protein: Negative mg/dL / Nitrite: Negative   Leuk Esterase: Negative / RBC: x / WBC x   Sq Epi: x / Non Sq Epi: x / Bacteria: x    UDS negative  A positive, antibody negative  HIV NR

## 2018-09-26 NOTE — PROGRESS NOTE ADULT - ASSESSMENT
22y  at 38w4d, GBS neg, with cHTN on Labetolol 100mg BID, for IOL s/p Cervidil x2, now on pitocin, doing well    - Cont current mgt  - Epidural for pain mgt  - VS [id number]    Dr. Miller to be made aware

## 2018-09-26 NOTE — PROGRESS NOTE ADULT - SUBJECTIVE AND OBJECTIVE BOX
PGY 3 Note    Patient seen at bedside, feeling contractions and desires epidural at this time, doing well otherwise.     Vital Signs Last 24 Hrs  T(C): 36.9 (26 Sep 2018 00:15), Max: 37.1 (25 Sep 2018 03:17)  T(F): 98.42 (26 Sep 2018 00:15), Max: 98.78 (25 Sep 2018 03:17)  HR: 91 (26 Sep 2018 01:51) (73 - 102)  BP: 135/89 (26 Sep 2018 01:51) (105/58 - 161/72)  RR: 18 (25 Sep 2018 03:17) (18 - 18)  EFM: 125/mod/+accels  TOCO: Qirreg	  SVE: Deferred, last VE /-3 at 2320 by Dr. Miller    Medications:  dinoprostone Insert: 10 milliGRAM(s) (18 @ 11:35)  dinoprostone Insert: 10 milliGRAM(s) (18 @ 19:14)  labetalol: 100 milliGRAM(s) (18 @ 17:59),  100 milliGRAM(s) (18 @ 06:00),  100 milliGRAM(s) (18 @ 22:09)  oxytocin Infusion: 2 mL/Hr (18 @ 23:31), now at 8mu/min      Labs:                        10.7   9.84  )-----------( 184      ( 24 Sep 2018 18:24 )             32.0           137  |  99  |  10  ----------------------------<  58<L>  4.4   |  21  |  0.7    Ca    10.5<H>      24 Sep 2018 18:24    TPro  6.5  /  Alb  3.8  /  TBili  0.6  /  DBili  x   /  AST  19  /  ALT  13  /  AlkPhos  137<H>  24      Urinalysis Basic - ( 24 Sep 2018 17:58 )    Color: Yellow / Appearance: Clear / S.015 / pH: x  Gluc: x / Ketone: Negative  / Bili: Negative / Urobili: 0.2 mg/dL   Blood: x / Protein: Negative mg/dL / Nitrite: Negative   Leuk Esterase: Negative / RBC: x / WBC x   Sq Epi: x / Non Sq Epi: x / Bacteria: x    UDS negative  A positive, antibody negative  HIV NR

## 2018-09-26 NOTE — PROGRESS NOTE ADULT - SUBJECTIVE AND OBJECTIVE BOX
PGY2 Note    Patient seen and evaluated at bedside, reporting mild pain with contractions, s/p top off.    T(F): 98.42 ( @ 07:30), Max: 98.6 ( @ 05:05)  HR: 81 ( @ 10:30)  BP: 136/71 ( @ 10:30) (90/53 - 161/72)  EFM: 140/mod/+accels, occ early decels  TOCO: q3min  SVE: deferred, last examined at 0932 by Dr. Miller /-2    Medications:  dinoprostone Insert: 10 ( @ 11:35)  dinoprostone Insert: 10 ( @ 19:14)  labetalol: 100 ( @ 06:24),  100 ( @ 17:59),  100 ( @ 06:00),  100 ( @ 22:09)  oxytocin Infusion: 2 ( @ 23:31)    Labs:                        10.7   9.84  )-----------( 184      ( 24 Sep 2018 18:24 )             32.0         137  |  99  |  10  ----------------------------<  58<L>  4.4   |  21  |  0.7    Ca    10.5<H>      24 Sep 2018 18:24    TPro  6.5  /  Alb  3.8  /  TBili  0.6  /  DBili  x   /  AST  19  /  ALT  13  /  AlkPhos  137<H>      Urinalysis Basic - ( 24 Sep 2018 17:58 )    Color: Yellow / Appearance: Clear / S.015 / pH: x  Gluc: x / Ketone: Negative  / Bili: Negative / Urobili: 0.2 mg/dL   Blood: x / Protein: Negative mg/dL / Nitrite: Negative   Leuk Esterase: Negative / RBC: x / WBC x   Sq Epi: x / Non Sq Epi: x / Bacteria: x    UDS neg

## 2018-09-26 NOTE — H&P NICU. - NS MD HP NEO PE NEURO NORMAL
Jose Angel and grasp reflexes acceptable/Global muscle tone and symmetry normal/Normal suck-swallow patterns for age

## 2018-09-26 NOTE — H&P NICU. - OTHER
Testes descended bilaterally, scrotum symmetric in size, normal urethal opening Asymmetric crying facies - left sided droop

## 2018-09-26 NOTE — H&P NICU. - NS MD HP NEO PE HEART NORMAL
Murmurs absent/Pulse with normal variation, frequency and intensity (amplitude & strength) with equal intensity on upper and lower extremities/Blood pressure value(s) are adequate/PMI and heart sounds localize heart on left side of chest

## 2018-09-26 NOTE — H&P NICU. - ASSESSMENT
Patient is a 38.5 week AGA male infant born via  to 22 yo  mother with history of chronic hypertension treated with labetolol. APGARs 9/9. Mom's prenatals are negative. Mom's blood type is A+. Patient was admitted to high-risk nursery for monitoring of blood glucose measurements in view of maternal labetolol use.     BW- 3200 (41%)   HC- 35 (67%)   L- 51 (67%)  PI- 2.4 (25%-50%)     Plan:   Resp:   - Room air  CVS:   - Hemodynamically stable, monitor HR and BP  FEN:   - Monitor blood glucose as per protocol   - PO Sim advance formula feeds ad trevin   Heme:   - Obtain TC bili at 24 hours of life   ID:   - Monitor temp   GI/:   - Monitor I/Os

## 2018-09-26 NOTE — PROGRESS NOTE ADULT - ASSESSMENT
22y  at 38w5d, GBS neg, with cHTN on Labetolol 100mg BID, for IOL s/p Cervidil x2, now on pitocin, s/p epidural, doing well  -continue pitocin  -pain management prn  -cont efm/toco  -cont to monitor vitals  -cont iv hydration  -re-examine as needed    Dr. Banks aware, will inform Dr. Miller

## 2018-09-26 NOTE — H&P NICU. - NS MD HP NEO PE SKIN NORMAL
Normal patterns of skin texture/Normal patterns of skin perfusion/No rashes/Normal patterns of skin integrity/No signs of meconium exposure/Normal patterns of skin color/Normal patterns of skin vascularity/No eruptions/Normal patterns of skin pigmentation

## 2018-09-26 NOTE — H&P NICU. - NS MD HP NEO PE ABDOMEN NORMAL
Umbilicus with 3 vessels, normal color size and texture/Normal contour/Abdominal wall defects absent/Adequate bowel sound pattern for age/Abdominal distention and masses absent

## 2018-09-26 NOTE — H&P NICU. - NS MD HP NEO PE NECK NORMAL
Without masses/Without webbing/Without redundant skin/Normal and symmetric appearance/Clavicles of normal shape, contour & nontender on palpation

## 2018-09-26 NOTE — PROGRESS NOTE ADULT - ASSESSMENT
22y  at 38w5d, GBS neg, with cHTN on Labetolol 100mg BID, for IOL s/p Cervidil x2, now on pitocin, s/p epidural, doing well    - Cont current mgt  - Pain mgt prn  - VS [id number]    Dr. Miller to be made aware

## 2018-09-27 LAB
BASOPHILS # BLD AUTO: 0.02 K/UL — SIGNIFICANT CHANGE UP (ref 0–0.2)
BASOPHILS NFR BLD AUTO: 0.1 % — SIGNIFICANT CHANGE UP (ref 0–1)
EOSINOPHIL # BLD AUTO: 0.03 K/UL — SIGNIFICANT CHANGE UP (ref 0–0.7)
EOSINOPHIL NFR BLD AUTO: 0.2 % — SIGNIFICANT CHANGE UP (ref 0–8)
HCT VFR BLD CALC: 23.4 % — LOW (ref 37–47)
HCT VFR BLD CALC: 24.3 % — LOW (ref 37–47)
HGB BLD-MCNC: 7.7 G/DL — LOW (ref 12–16)
HGB BLD-MCNC: 7.7 G/DL — LOW (ref 12–16)
IMM GRANULOCYTES NFR BLD AUTO: 0.7 % — HIGH (ref 0.1–0.3)
LYMPHOCYTES # BLD AUTO: 1.79 K/UL — SIGNIFICANT CHANGE UP (ref 1.2–3.4)
LYMPHOCYTES # BLD AUTO: 12.7 % — LOW (ref 20.5–51.1)
MCHC RBC-ENTMCNC: 29.3 PG — SIGNIFICANT CHANGE UP (ref 27–31)
MCHC RBC-ENTMCNC: 29.7 PG — SIGNIFICANT CHANGE UP (ref 27–31)
MCHC RBC-ENTMCNC: 31.7 G/DL — LOW (ref 32–37)
MCHC RBC-ENTMCNC: 32.9 G/DL — SIGNIFICANT CHANGE UP (ref 32–37)
MCV RBC AUTO: 90.3 FL — SIGNIFICANT CHANGE UP (ref 81–99)
MCV RBC AUTO: 92.4 FL — SIGNIFICANT CHANGE UP (ref 81–99)
MONOCYTES # BLD AUTO: 0.79 K/UL — HIGH (ref 0.1–0.6)
MONOCYTES NFR BLD AUTO: 5.6 % — SIGNIFICANT CHANGE UP (ref 1.7–9.3)
NEUTROPHILS # BLD AUTO: 11.32 K/UL — HIGH (ref 1.4–6.5)
NEUTROPHILS NFR BLD AUTO: 80.7 % — HIGH (ref 42.2–75.2)
NRBC # BLD: 0 /100 WBCS — SIGNIFICANT CHANGE UP (ref 0–0)
NRBC # BLD: 0 /100 WBCS — SIGNIFICANT CHANGE UP (ref 0–0)
PLATELET # BLD AUTO: 131 K/UL — SIGNIFICANT CHANGE UP (ref 130–400)
PLATELET # BLD AUTO: 136 K/UL — SIGNIFICANT CHANGE UP (ref 130–400)
RBC # BLD: 2.59 M/UL — LOW (ref 4.2–5.4)
RBC # BLD: 2.63 M/UL — LOW (ref 4.2–5.4)
RBC # FLD: 14.4 % — SIGNIFICANT CHANGE UP (ref 11.5–14.5)
RBC # FLD: 14.5 % — SIGNIFICANT CHANGE UP (ref 11.5–14.5)
WBC # BLD: 13.87 K/UL — HIGH (ref 4.8–10.8)
WBC # BLD: 14.05 K/UL — HIGH (ref 4.8–10.8)
WBC # FLD AUTO: 13.87 K/UL — HIGH (ref 4.8–10.8)
WBC # FLD AUTO: 14.05 K/UL — HIGH (ref 4.8–10.8)

## 2018-09-27 RX ORDER — FERROUS SULFATE 325(65) MG
325 TABLET ORAL THREE TIMES A DAY
Qty: 0 | Refills: 0 | Status: DISCONTINUED | OUTPATIENT
Start: 2018-09-27 | End: 2018-09-27

## 2018-09-27 RX ORDER — FERROUS SULFATE 325(65) MG
325 TABLET ORAL DAILY
Qty: 0 | Refills: 0 | Status: DISCONTINUED | OUTPATIENT
Start: 2018-09-27 | End: 2018-09-28

## 2018-09-27 RX ADMIN — Medication 600 MILLIGRAM(S): at 14:22

## 2018-09-27 RX ADMIN — Medication 100 MILLIGRAM(S): at 06:21

## 2018-09-27 RX ADMIN — Medication 600 MILLIGRAM(S): at 11:07

## 2018-09-27 RX ADMIN — Medication 325 MILLIGRAM(S): at 17:56

## 2018-09-27 RX ADMIN — Medication 1 TABLET(S): at 11:04

## 2018-09-27 RX ADMIN — Medication 600 MILLIGRAM(S): at 02:43

## 2018-09-27 RX ADMIN — Medication 600 MILLIGRAM(S): at 19:39

## 2018-09-27 RX ADMIN — Medication 100 MILLIGRAM(S): at 17:56

## 2018-09-27 NOTE — CHART NOTE - NSCHARTNOTEFT_GEN_A_CORE
Pt evaluated at bedside for H&H drop s/p  (10.7/32.0 --> 7.7/23.4). Pt doing well, no complaints. Denies lightheadedness/dizziness with ambulation, SOB, palpitations and chest pain. Pt prescribed iron 325 mg tid.     Dr. Gutiérrez and Dr. Miller aware. Pt evaluated at bedside for H&H drop s/p  (10.7/32.0 --> 7.7/23.4). Pt doing well, no complaints. Denies lightheadedness/dizziness with ambulation, SOB, palpitations and chest pain. Pt prescribed iron 325 mg once a day.     Dr. Gutiérrez and Dr. Miller aware.

## 2018-09-28 ENCOUNTER — TRANSCRIPTION ENCOUNTER (OUTPATIENT)
Age: 23
End: 2018-09-28

## 2018-09-28 VITALS
RESPIRATION RATE: 18 BRPM | SYSTOLIC BLOOD PRESSURE: 124 MMHG | HEART RATE: 88 BPM | TEMPERATURE: 98 F | DIASTOLIC BLOOD PRESSURE: 72 MMHG

## 2018-09-28 RX ORDER — LIDOCAINE HCL 20 MG/ML
1 VIAL (ML) INJECTION ONCE
Qty: 0 | Refills: 0 | Status: COMPLETED | OUTPATIENT
Start: 2018-09-28 | End: 2018-09-28

## 2018-09-28 RX ORDER — LABETALOL HCL 100 MG
1 TABLET ORAL
Qty: 0 | Refills: 0 | DISCHARGE
Start: 2018-09-28

## 2018-09-28 RX ADMIN — Medication 600 MILLIGRAM(S): at 12:34

## 2018-09-28 RX ADMIN — Medication 600 MILLIGRAM(S): at 06:31

## 2018-09-28 RX ADMIN — Medication 100 MILLIGRAM(S): at 06:31

## 2018-09-28 RX ADMIN — Medication 1 TABLET(S): at 11:57

## 2018-09-28 RX ADMIN — Medication 325 MILLIGRAM(S): at 11:57

## 2018-09-28 NOTE — DISCHARGE NOTE OB - CARE PLAN
Principal Discharge DX:	Vaginal delivery  Goal:	healthy patient  Assessment and plan of treatment:	followup with your obgyn doctor in 6 weeks for postpartum care  Secondary Diagnosis:	Hypertension, unspecified type  Goal:	good blood pressure control  Assessment and plan of treatment:	If you have headache, blurry vision, chest pain, difficulty breathing, or severe abdominal pain, call the doctor or return to the hospital. Please f/u with your doctor in 1 week for a BP check.

## 2018-09-28 NOTE — DISCHARGE NOTE OB - PATIENT PORTAL LINK FT
You can access the [facility] Patient [facility], offered by [facility], by registering with the following website: [email or web address]

## 2018-09-28 NOTE — DISCHARGE NOTE OB - MEDICATION SUMMARY - MEDICATIONS TO TAKE
I will START or STAY ON the medications listed below when I get home from the hospital:    labetalol 100 mg oral tablet  -- 1 tab(s) by mouth 2 times a day  -- Indication: For cont home meds

## 2018-09-28 NOTE — DISCHARGE NOTE OB - PLAN OF CARE
healthy patient followup with your obgyn doctor in 6 weeks for postpartum care good blood pressure control If you have headache, blurry vision, chest pain, difficulty breathing, or severe abdominal pain, call the doctor or return to the hospital. Please f/u with your doctor in 1 week for a BP check.

## 2018-09-28 NOTE — DISCHARGE NOTE OB - CARE PROVIDER_API CALL
Luisa Miller), Obstetrics and Gynecology  [address] [place] [address]  Phone: [phone]  Fax: [phone]

## 2018-10-02 DIAGNOSIS — Z3A.38 38 WEEKS GESTATION OF PREGNANCY: ICD-10-CM

## 2018-10-02 DIAGNOSIS — Z34.03 ENCOUNTER FOR SUPERVISION OF NORMAL FIRST PREGNANCY, THIRD TRIMESTER: ICD-10-CM

## 2020-01-07 ENCOUNTER — TRANSCRIPTION ENCOUNTER (OUTPATIENT)
Age: 25
End: 2020-01-07

## 2020-09-18 NOTE — PROCEDURE NOTE - NSPROCNAME_GEN_A_CORE
History and physical documented and up to date, allergies reviewed, lab results reviewed, pre-procedure education provided, patient verbalized understanding of procedure, procedural consent signed and patient is NPO. Labor Anesthesia

## 2020-11-08 ENCOUNTER — TRANSCRIPTION ENCOUNTER (OUTPATIENT)
Age: 25
End: 2020-11-08

## 2020-11-14 NOTE — DISCHARGE NOTE OB - NS DC ANGIO PCI YN
no PAST MEDICAL HISTORY:  2019 novel coronavirus disease (COVID-19)     Afib     Anemia     Bacterial pneumonia     BPH (benign prostatic hyperplasia)     Cardiomyopathy     CHF (congestive heart failure)     Cognitive impairment     DM (diabetes mellitus)     Encounter for blood transfusion     Falls     Gout     Heart failure     Hiatal hernia     History of gross hematuria     History of orthostatic hypotension     HTN (hypertension)     Muscle weakness (generalized)

## 2020-11-18 NOTE — H&P NICU. - GRAVIDA, OB PROFILE
HR=63 bpm, SVHN=552/67 mmhg, SpO2=93.0 %, Resp=15 B/min, EtCO2=21 mmHg, Apnea=4 Seconds, Rodrigues=3 1

## 2021-03-15 ENCOUNTER — TRANSCRIPTION ENCOUNTER (OUTPATIENT)
Age: 26
End: 2021-03-15

## 2021-06-16 ENCOUNTER — EMERGENCY (EMERGENCY)
Facility: HOSPITAL | Age: 26
LOS: 0 days | Discharge: HOME | End: 2021-06-16
Attending: EMERGENCY MEDICINE | Admitting: EMERGENCY MEDICINE
Payer: MEDICAID

## 2021-06-16 VITALS
TEMPERATURE: 97 F | WEIGHT: 244.93 LBS | HEIGHT: 67 IN | HEART RATE: 99 BPM | DIASTOLIC BLOOD PRESSURE: 71 MMHG | SYSTOLIC BLOOD PRESSURE: 126 MMHG | OXYGEN SATURATION: 98 % | RESPIRATION RATE: 20 BRPM

## 2021-06-16 VITALS
DIASTOLIC BLOOD PRESSURE: 67 MMHG | HEART RATE: 80 BPM | RESPIRATION RATE: 20 BRPM | OXYGEN SATURATION: 99 % | SYSTOLIC BLOOD PRESSURE: 120 MMHG

## 2021-06-16 DIAGNOSIS — I10 ESSENTIAL (PRIMARY) HYPERTENSION: ICD-10-CM

## 2021-06-16 DIAGNOSIS — R55 SYNCOPE AND COLLAPSE: ICD-10-CM

## 2021-06-16 DIAGNOSIS — M54.2 CERVICALGIA: ICD-10-CM

## 2021-06-16 DIAGNOSIS — H53.8 OTHER VISUAL DISTURBANCES: ICD-10-CM

## 2021-06-16 DIAGNOSIS — F41.9 ANXIETY DISORDER, UNSPECIFIED: ICD-10-CM

## 2021-06-16 DIAGNOSIS — Z79.899 OTHER LONG TERM (CURRENT) DRUG THERAPY: ICD-10-CM

## 2021-06-16 DIAGNOSIS — J18.9 PNEUMONIA, UNSPECIFIED ORGANISM: ICD-10-CM

## 2021-06-16 LAB
ALBUMIN SERPL ELPH-MCNC: 4.8 G/DL — SIGNIFICANT CHANGE UP (ref 3.5–5.2)
ALP SERPL-CCNC: 63 U/L — SIGNIFICANT CHANGE UP (ref 30–115)
ALT FLD-CCNC: 12 U/L — SIGNIFICANT CHANGE UP (ref 0–41)
ANION GAP SERPL CALC-SCNC: 8 MMOL/L — SIGNIFICANT CHANGE UP (ref 7–14)
APPEARANCE UR: CLEAR — SIGNIFICANT CHANGE UP
AST SERPL-CCNC: 15 U/L — SIGNIFICANT CHANGE UP (ref 0–41)
BACTERIA # UR AUTO: ABNORMAL
BASOPHILS # BLD AUTO: 0.03 K/UL — SIGNIFICANT CHANGE UP (ref 0–0.2)
BASOPHILS NFR BLD AUTO: 0.3 % — SIGNIFICANT CHANGE UP (ref 0–1)
BILIRUB SERPL-MCNC: 0.6 MG/DL — SIGNIFICANT CHANGE UP (ref 0.2–1.2)
BILIRUB UR-MCNC: NEGATIVE — SIGNIFICANT CHANGE UP
BUN SERPL-MCNC: 10 MG/DL — SIGNIFICANT CHANGE UP (ref 10–20)
CALCIUM SERPL-MCNC: 10.4 MG/DL — HIGH (ref 8.5–10.1)
CHLORIDE SERPL-SCNC: 100 MMOL/L — SIGNIFICANT CHANGE UP (ref 98–110)
CO2 SERPL-SCNC: 29 MMOL/L — SIGNIFICANT CHANGE UP (ref 17–32)
COLOR SPEC: YELLOW — SIGNIFICANT CHANGE UP
CREAT SERPL-MCNC: 0.8 MG/DL — SIGNIFICANT CHANGE UP (ref 0.7–1.5)
DIFF PNL FLD: ABNORMAL
EOSINOPHIL # BLD AUTO: 0.09 K/UL — SIGNIFICANT CHANGE UP (ref 0–0.7)
EOSINOPHIL NFR BLD AUTO: 0.8 % — SIGNIFICANT CHANGE UP (ref 0–8)
EPI CELLS # UR: ABNORMAL /HPF
GLUCOSE SERPL-MCNC: 102 MG/DL — HIGH (ref 70–99)
GLUCOSE UR QL: NEGATIVE MG/DL — SIGNIFICANT CHANGE UP
HCG SERPL QL: NEGATIVE — SIGNIFICANT CHANGE UP
HCT VFR BLD CALC: 42 % — SIGNIFICANT CHANGE UP (ref 37–47)
HGB BLD-MCNC: 13.6 G/DL — SIGNIFICANT CHANGE UP (ref 12–16)
IMM GRANULOCYTES NFR BLD AUTO: 0.3 % — SIGNIFICANT CHANGE UP (ref 0.1–0.3)
KETONES UR-MCNC: NEGATIVE — SIGNIFICANT CHANGE UP
LEUKOCYTE ESTERASE UR-ACNC: ABNORMAL
LIDOCAIN IGE QN: 29 U/L — SIGNIFICANT CHANGE UP (ref 7–60)
LYMPHOCYTES # BLD AUTO: 1.96 K/UL — SIGNIFICANT CHANGE UP (ref 1.2–3.4)
LYMPHOCYTES # BLD AUTO: 16.6 % — LOW (ref 20.5–51.1)
MAGNESIUM SERPL-MCNC: 2 MG/DL — SIGNIFICANT CHANGE UP (ref 1.8–2.4)
MCHC RBC-ENTMCNC: 28 PG — SIGNIFICANT CHANGE UP (ref 27–31)
MCHC RBC-ENTMCNC: 32.4 G/DL — SIGNIFICANT CHANGE UP (ref 32–37)
MCV RBC AUTO: 86.4 FL — SIGNIFICANT CHANGE UP (ref 81–99)
MONOCYTES # BLD AUTO: 0.63 K/UL — HIGH (ref 0.1–0.6)
MONOCYTES NFR BLD AUTO: 5.3 % — SIGNIFICANT CHANGE UP (ref 1.7–9.3)
NEUTROPHILS # BLD AUTO: 9.04 K/UL — HIGH (ref 1.4–6.5)
NEUTROPHILS NFR BLD AUTO: 76.7 % — HIGH (ref 42.2–75.2)
NITRITE UR-MCNC: NEGATIVE — SIGNIFICANT CHANGE UP
NRBC # BLD: 0 /100 WBCS — SIGNIFICANT CHANGE UP (ref 0–0)
NT-PROBNP SERPL-SCNC: 36 PG/ML — SIGNIFICANT CHANGE UP (ref 0–300)
PH UR: 6.5 — SIGNIFICANT CHANGE UP (ref 5–8)
PLATELET # BLD AUTO: 298 K/UL — SIGNIFICANT CHANGE UP (ref 130–400)
POTASSIUM SERPL-MCNC: 4.1 MMOL/L — SIGNIFICANT CHANGE UP (ref 3.5–5)
POTASSIUM SERPL-SCNC: 4.1 MMOL/L — SIGNIFICANT CHANGE UP (ref 3.5–5)
PROT SERPL-MCNC: 8.2 G/DL — HIGH (ref 6–8)
PROT UR-MCNC: NEGATIVE MG/DL — SIGNIFICANT CHANGE UP
RBC # BLD: 4.86 M/UL — SIGNIFICANT CHANGE UP (ref 4.2–5.4)
RBC # FLD: 13.1 % — SIGNIFICANT CHANGE UP (ref 11.5–14.5)
RBC CASTS # UR COMP ASSIST: ABNORMAL /HPF
SODIUM SERPL-SCNC: 137 MMOL/L — SIGNIFICANT CHANGE UP (ref 135–146)
SP GR SPEC: 1.02 — SIGNIFICANT CHANGE UP (ref 1.01–1.03)
TROPONIN T SERPL-MCNC: <0.01 NG/ML — SIGNIFICANT CHANGE UP
UROBILINOGEN FLD QL: 0.2 MG/DL — SIGNIFICANT CHANGE UP (ref 0.2–0.2)
WBC # BLD: 11.79 K/UL — HIGH (ref 4.8–10.8)
WBC # FLD AUTO: 11.79 K/UL — HIGH (ref 4.8–10.8)
WBC UR QL: SIGNIFICANT CHANGE UP /HPF

## 2021-06-16 PROCEDURE — 70498 CT ANGIOGRAPHY NECK: CPT | Mod: 26,MG

## 2021-06-16 PROCEDURE — 93010 ELECTROCARDIOGRAM REPORT: CPT | Mod: NC

## 2021-06-16 PROCEDURE — 70496 CT ANGIOGRAPHY HEAD: CPT | Mod: 26,MG

## 2021-06-16 PROCEDURE — 71046 X-RAY EXAM CHEST 2 VIEWS: CPT | Mod: 26,76

## 2021-06-16 PROCEDURE — G1004: CPT

## 2021-06-16 PROCEDURE — 99284 EMERGENCY DEPT VISIT MOD MDM: CPT

## 2021-06-16 NOTE — ED PROVIDER NOTE - CARE PROVIDER_API CALL
Jacque Burrell  INTERNAL MEDICINE  [address] [place] [address]  Phone: [phone]  Fax: [phone]  Follow Up Time:

## 2021-06-16 NOTE — ED PROVIDER NOTE - OBJECTIVE STATEMENT
Pt is a 25y/0 female presents today c/o left neck pain x 1 week with associated near syncopal episode that began today lasting for approx 15 minutes. Pt denies abd pain, n/v/d, fever, chills, weakness, numbness, abd pain

## 2021-06-16 NOTE — ED PROVIDER NOTE - ATTENDING CONTRIBUTION TO CARE
I was present for and supervised the key and critical aspects of the procedures performed during the care of the patient. I personally evaluated the patient. I reviewed the Resident’s or Physician Assistant’s note (as assigned above), and agree with the findings and plan except as documented in my note.  26 y/o F with PMHx HTN presents BIBA for evaluation of lightheadedness, tunnel vision and left sided neck pain. Pt states the neck pain has been present for the past week, but was concerned with the episode lightheadedness and tunnel vision today, so EMS was called. No LOC. Pt states her neck pain is moderate, constant and hurts when breathing located on the left scm she denies any trauma. Pt denies fever, chills, HA, CP, SOB, abdominal pain, n/v/d, numbness, weakness or tingling. On exam: NCAT. No ecchymosis to the neck. PERRLA, EOMI. OP clear. Lungs CTAB. RRR, S1S2 noted. Radial pulses 2+ and equal, pedal pulses 2+ and equal. Abdomen soft, NT/ND, no rebound or guarding. FROM x4 extremities. No focal neuro deficits, strength 5/5, sensations intact. A/P: Presyncope with neck pain not attributable to trauma. Will obtain EKG, labs including troponin and CTA head/neck.

## 2021-06-16 NOTE — ED PROVIDER NOTE - PATIENT PORTAL LINK FT
You can access the FollowMyHealth Patient Portal offered by [facility] by registering at the following website: [email or web address]. By joining [employer]’s FollowMyHealth portal, you will also be able to view your health information using other applications (apps) compatible with our system.

## 2021-06-16 NOTE — ED ADULT NURSE NOTE - NSIMPLEMENTINTERV_GEN_ALL_ED
Implemented All Universal Safety Interventions:  [place] to call system. Call bell, personal items and telephone within reach. Instruct patient to call for assistance. Room bathroom lighting operational. Non-slip footwear when patient is off stretcher. Physically safe environment: no spills, clutter or unnecessary equipment. Stretcher in lowest position, wheels locked, appropriate side rails in place.

## 2021-06-16 NOTE — ED PROVIDER NOTE - CLINICAL SUMMARY MEDICAL DECISION MAKING FREE TEXT BOX
patient presents with pre-syncopal symptoms she denies any actual cp, sob, fevers or chills she denies any focal deficits. based on her pain and the location of the neck I will obtain ct cta head and neck.  she has no focal deficits which are negative, we obtaiend ekg, labs and troponin which are negative heart score less than 5, in addition unlikely to be pe with no cp sob, no hypoxia noted.  she has a normal neuro exam with negative cta I will discharge at this time with follow up instructed to return to the ED for any further concerns

## 2021-06-16 NOTE — ED ADULT TRIAGE NOTE - CHIEF COMPLAINT QUOTE
BIBA from home as per EMS pt had an anxiety attack BIBA from home as per EMS pt had an anxiety attack. Denies SI/HI

## 2021-06-16 NOTE — ED PROVIDER NOTE - PROGRESS NOTE DETAILS
I personally evaluated the patient. I reviewed the Resident’s or Physician Assistant’s note (as assigned above), and agree with the findings and plan except as documented in my note.  26 y/o F with PMHx HTN presents BIBA for evaluation of lightheadedness, tunnel vision and left sided neck pain. Pt states the neck pain has been present for the past week, but was concerned with the episode lightheadedness and tunnel vision today, so EMS was called. Pt also reports episodes of photophobia. No LOC. Pt states her neck pain is moderate, constant and hurts when breathing. No trauma. Pt denies fever, chills, HA, CP, SOB, abdominal pain, n/v/d, numbness, weakness or tingling. On exam: NCAT. No ecchymosis to the neck. PERRLA, EOMI. OP clear. Lungs CTAB. RRR, S1S2 noted. Radial pulses 2+ and equal, pedal pulses 2+ and equal. Abdomen soft, NT/ND, no rebound or guarding. FROM x4 extremities. No focal neuro deficits, strength 5/5, sensations intact. A/P: Presyncope with neck pain not attributable to trauma. Will obtain EKG, labs including troponin and CTA head/neck. patient improved we obtaiend ct cta which are negative I will discharge with follow up to her pmd as well cardiology

## 2021-06-27 ENCOUNTER — EMERGENCY (EMERGENCY)
Facility: HOSPITAL | Age: 26
LOS: 0 days | Discharge: HOME | End: 2021-06-27
Attending: EMERGENCY MEDICINE | Admitting: EMERGENCY MEDICINE
Payer: MEDICAID

## 2021-06-27 VITALS
OXYGEN SATURATION: 100 % | WEIGHT: 235.01 LBS | HEART RATE: 79 BPM | DIASTOLIC BLOOD PRESSURE: 70 MMHG | HEIGHT: 67 IN | RESPIRATION RATE: 18 BRPM | SYSTOLIC BLOOD PRESSURE: 136 MMHG | TEMPERATURE: 98 F

## 2021-06-27 VITALS — HEART RATE: 70 BPM | SYSTOLIC BLOOD PRESSURE: 126 MMHG | DIASTOLIC BLOOD PRESSURE: 69 MMHG

## 2021-06-27 DIAGNOSIS — Z79.899 OTHER LONG TERM (CURRENT) DRUG THERAPY: ICD-10-CM

## 2021-06-27 DIAGNOSIS — R53.1 WEAKNESS: ICD-10-CM

## 2021-06-27 DIAGNOSIS — R42 DIZZINESS AND GIDDINESS: ICD-10-CM

## 2021-06-27 DIAGNOSIS — R07.89 OTHER CHEST PAIN: ICD-10-CM

## 2021-06-27 DIAGNOSIS — R53.83 OTHER FATIGUE: ICD-10-CM

## 2021-06-27 DIAGNOSIS — I10 ESSENTIAL (PRIMARY) HYPERTENSION: ICD-10-CM

## 2021-06-27 DIAGNOSIS — R11.0 NAUSEA: ICD-10-CM

## 2021-06-27 DIAGNOSIS — M54.2 CERVICALGIA: ICD-10-CM

## 2021-06-27 DIAGNOSIS — R63.8 OTHER SYMPTOMS AND SIGNS CONCERNING FOOD AND FLUID INTAKE: ICD-10-CM

## 2021-06-27 LAB
ALBUMIN SERPL ELPH-MCNC: 5 G/DL — SIGNIFICANT CHANGE UP (ref 3.5–5.2)
ALP SERPL-CCNC: 59 U/L — SIGNIFICANT CHANGE UP (ref 30–115)
ALT FLD-CCNC: 26 U/L — SIGNIFICANT CHANGE UP (ref 0–41)
ANION GAP SERPL CALC-SCNC: 11 MMOL/L — SIGNIFICANT CHANGE UP (ref 7–14)
APPEARANCE UR: CLEAR — SIGNIFICANT CHANGE UP
AST SERPL-CCNC: 28 U/L — SIGNIFICANT CHANGE UP (ref 0–41)
BASOPHILS # BLD AUTO: 0.02 K/UL — SIGNIFICANT CHANGE UP (ref 0–0.2)
BASOPHILS NFR BLD AUTO: 0.1 % — SIGNIFICANT CHANGE UP (ref 0–1)
BILIRUB SERPL-MCNC: 1.2 MG/DL — SIGNIFICANT CHANGE UP (ref 0.2–1.2)
BILIRUB UR-MCNC: NEGATIVE — SIGNIFICANT CHANGE UP
BUN SERPL-MCNC: 14 MG/DL — SIGNIFICANT CHANGE UP (ref 10–20)
CALCIUM SERPL-MCNC: 10.6 MG/DL — HIGH (ref 8.5–10.1)
CHLORIDE SERPL-SCNC: 101 MMOL/L — SIGNIFICANT CHANGE UP (ref 98–110)
CO2 SERPL-SCNC: 26 MMOL/L — SIGNIFICANT CHANGE UP (ref 17–32)
COLOR SPEC: YELLOW — SIGNIFICANT CHANGE UP
CREAT SERPL-MCNC: 0.8 MG/DL — SIGNIFICANT CHANGE UP (ref 0.7–1.5)
DIFF PNL FLD: NEGATIVE — SIGNIFICANT CHANGE UP
EOSINOPHIL # BLD AUTO: 0.09 K/UL — SIGNIFICANT CHANGE UP (ref 0–0.7)
EOSINOPHIL NFR BLD AUTO: 0.7 % — SIGNIFICANT CHANGE UP (ref 0–8)
GLUCOSE SERPL-MCNC: 83 MG/DL — SIGNIFICANT CHANGE UP (ref 70–99)
GLUCOSE UR QL: NEGATIVE MG/DL — SIGNIFICANT CHANGE UP
HCG SERPL QL: NEGATIVE — SIGNIFICANT CHANGE UP
HCT VFR BLD CALC: 42.7 % — SIGNIFICANT CHANGE UP (ref 37–47)
HGB BLD-MCNC: 13.8 G/DL — SIGNIFICANT CHANGE UP (ref 12–16)
IMM GRANULOCYTES NFR BLD AUTO: 0.2 % — SIGNIFICANT CHANGE UP (ref 0.1–0.3)
KETONES UR-MCNC: 40
LEUKOCYTE ESTERASE UR-ACNC: NEGATIVE — SIGNIFICANT CHANGE UP
LYMPHOCYTES # BLD AUTO: 2.99 K/UL — SIGNIFICANT CHANGE UP (ref 1.2–3.4)
LYMPHOCYTES # BLD AUTO: 22.2 % — SIGNIFICANT CHANGE UP (ref 20.5–51.1)
MCHC RBC-ENTMCNC: 28 PG — SIGNIFICANT CHANGE UP (ref 27–31)
MCHC RBC-ENTMCNC: 32.3 G/DL — SIGNIFICANT CHANGE UP (ref 32–37)
MCV RBC AUTO: 86.8 FL — SIGNIFICANT CHANGE UP (ref 81–99)
MONOCYTES # BLD AUTO: 0.8 K/UL — HIGH (ref 0.1–0.6)
MONOCYTES NFR BLD AUTO: 5.9 % — SIGNIFICANT CHANGE UP (ref 1.7–9.3)
NEUTROPHILS # BLD AUTO: 9.56 K/UL — HIGH (ref 1.4–6.5)
NEUTROPHILS NFR BLD AUTO: 70.9 % — SIGNIFICANT CHANGE UP (ref 42.2–75.2)
NITRITE UR-MCNC: NEGATIVE — SIGNIFICANT CHANGE UP
NRBC # BLD: 0 /100 WBCS — SIGNIFICANT CHANGE UP (ref 0–0)
PH UR: 6 — SIGNIFICANT CHANGE UP (ref 5–8)
PLATELET # BLD AUTO: 307 K/UL — SIGNIFICANT CHANGE UP (ref 130–400)
POTASSIUM SERPL-MCNC: 4.2 MMOL/L — SIGNIFICANT CHANGE UP (ref 3.5–5)
POTASSIUM SERPL-SCNC: 4.2 MMOL/L — SIGNIFICANT CHANGE UP (ref 3.5–5)
PROT SERPL-MCNC: 8.3 G/DL — HIGH (ref 6–8)
PROT UR-MCNC: NEGATIVE MG/DL — SIGNIFICANT CHANGE UP
RBC # BLD: 4.92 M/UL — SIGNIFICANT CHANGE UP (ref 4.2–5.4)
RBC # FLD: 13.1 % — SIGNIFICANT CHANGE UP (ref 11.5–14.5)
SODIUM SERPL-SCNC: 138 MMOL/L — SIGNIFICANT CHANGE UP (ref 135–146)
SP GR SPEC: 1.01 — SIGNIFICANT CHANGE UP (ref 1.01–1.03)
TROPONIN T SERPL-MCNC: <0.01 NG/ML — SIGNIFICANT CHANGE UP
UROBILINOGEN FLD QL: 0.2 MG/DL — SIGNIFICANT CHANGE UP (ref 0.2–0.2)
WBC # BLD: 13.49 K/UL — HIGH (ref 4.8–10.8)
WBC # FLD AUTO: 13.49 K/UL — HIGH (ref 4.8–10.8)

## 2021-06-27 PROCEDURE — 71046 X-RAY EXAM CHEST 2 VIEWS: CPT | Mod: 26

## 2021-06-27 PROCEDURE — 99285 EMERGENCY DEPT VISIT HI MDM: CPT

## 2021-06-27 PROCEDURE — 93010 ELECTROCARDIOGRAM REPORT: CPT | Mod: NC

## 2021-06-27 RX ORDER — MECLIZINE HCL 12.5 MG
1 TABLET ORAL
Qty: 7 | Refills: 0
Start: 2021-06-27 | End: 2021-07-03

## 2021-06-27 RX ORDER — KETOROLAC TROMETHAMINE 30 MG/ML
15 SYRINGE (ML) INJECTION ONCE
Refills: 0 | Status: DISCONTINUED | OUTPATIENT
Start: 2021-06-27 | End: 2021-06-27

## 2021-06-27 RX ORDER — SODIUM CHLORIDE 9 MG/ML
1000 INJECTION INTRAMUSCULAR; INTRAVENOUS; SUBCUTANEOUS ONCE
Refills: 0 | Status: COMPLETED | OUTPATIENT
Start: 2021-06-27 | End: 2021-06-27

## 2021-06-27 RX ADMIN — SODIUM CHLORIDE 1000 MILLILITER(S): 9 INJECTION INTRAMUSCULAR; INTRAVENOUS; SUBCUTANEOUS at 19:44

## 2021-06-27 RX ADMIN — Medication 15 MILLIGRAM(S): at 20:41

## 2021-06-27 NOTE — ED PROVIDER NOTE - OBJECTIVE STATEMENT
25 year old F with hx of HTN presenting to ed with mult complaints. Sts for the last month has had episodes of L sided chest pain radiating to L side of neck with assoc dizziness/palpitations. + fatigue, nausea and decreased appetite. Pt was seen in ED 06/16 for similar symptoms and had neg CTA head/neck. Pt sts is following with card Dr. Valdes tomorrow. Sts came to ED today due to persistent symptoms. Denies any fever/chills, cough, sob, abd pain, vomiting, diarrhea, syncope, leg pain/swelling, recent travel, hx of pe/dvt, significant family hx.

## 2021-06-27 NOTE — ED ADULT TRIAGE NOTE - CHIEF COMPLAINT QUOTE
diagnosed   with  pneumonia   2   weeks   ago   only   tolerated   1   day  of   antibiotics         now    weak   weak   and   dizzy   and   SOB

## 2021-06-27 NOTE — ED PROVIDER NOTE - PATIENT PORTAL LINK FT
You can access the FollowMyHealth Patient Portal offered by [facility] by registering at the following website: [email or web address]. By joining [employer]’s FollowMyHealth portal, you will also be able to view your health information using other applications (apps) compatible with our system.

## 2021-06-27 NOTE — ED PROVIDER NOTE - PHYSICAL EXAMINATION
CONSTITUTIONAL: Well-appearing; well-nourished; in no apparent distress.   EYES: PERRL; EOM intact.   ENT: normal nose; no rhinorrhea; normal pharynx with no tonsillar hypertrophy.   NECK: Supple; non-tender; no cervical lymphadenopathy.   CARDIOVASCULAR: Normal S1, S2; no murmurs, rubs, or gallops. Equal radial pulses  RESPIRATORY: Normal chest excursion with respiration; breath sounds clear and equal bilaterally; no wheezes, rhonchi, or rales.  GI/: Normal bowel sounds; non-distended; non-tender; no palpable organomegaly.   MS: No evidence of trauma or deformity. Normal ROM in all four extremities; non-tender to palpation; distal pulses are normal.   SKIN: Normal for age and race; warm; dry; good turgor; no apparent lesions or exudate.   NEURO/PSYCH: A & O x 4; grossly unremarkable. mood and manner are appropriate. No focal deficits. No facial droop. No tongue deviation. Strength equal b/l 5/5 throughout

## 2021-06-27 NOTE — ED PROVIDER NOTE - CLINICAL SUMMARY MEDICAL DECISION MAKING FREE TEXT BOX
25F p/w multiple complaints of left side chest pain, neck pain, fatigue, nausea and decreased appetite. Vitals reviewed by me noted to be wnl. Physical-nad,perrl,mmm,rrr,ctab,abd softntnd,fromx4,anox3. Labs reviewed by me, values noted to be within normal limits. ED Chest X-Ray reviewed by me which did not reveal a ptx, infiltrate, or effusion. ekg-wnl. Was seen in the ed prior for headache with a normal cta. Will fu with pmd

## 2021-06-27 NOTE — ED PROVIDER NOTE - NSFOLLOWUPINSTRUCTIONS_ED_ALL_ED_FT
Chest Pain    Chest pain can be caused by many different conditions which may or may not be dangerous. Causes include heartburn, lung infections, heart attack, blood clot in lungs, skin infections, strain or damage to muscle, cartilage, or bones, etc. In addition to a history and physical examination, an electrocardiogram (ECG) or other lab tests may have been performed to determine the cause of your chest pain. Follow up with your primary care provider or with a cardiologist as instructed.     SEEK IMMEDIATE MEDICAL CARE IF YOU HAVE ANY OF THE FOLLOWING SYMPTOMS: worsening chest pain, coughing up blood, unexplained back/neck/jaw pain, severe abdominal pain, dizziness or lightheadedness, fainting, shortness of breath, sweaty or clammy skin, vomiting, or racing heart beat. These symptoms may represent a serious problem that is an emergency. Do not wait to see if the symptoms will go away. Get medical help right away. Call 911 and do not drive yourself to the hospital.    Weakness    Weakness is a lack of strength. It may be felt all over the body (generalized) or in one specific part of the body (focal). Some causes of weakness can be serious. You may need further medical evaluation, especially if you are elderly or you have a history of immunosuppression (such as chemotherapy or HIV), kidney disease, heart disease, or diabetes.     CAUSES  Weakness can be caused by many different things, including:    Infection.  Physical exhaustion.  Internal bleeding or other blood loss that results in a lack of red blood cells (anemia).  Dehydration. This cause is more common in elderly people.  Side effects or electrolyte abnormalities from medicines, such as pain medicines or sedatives.  Emotional distress, anxiety, or depression.  Circulation problems, especially severe peripheral arterial disease.  Heart disease, such as rapid atrial fibrillation, bradycardia, or heart failure.  Nervous system disorders, such as Guillain-Barré syndrome, multiple sclerosis, or stroke.    DIAGNOSIS  To find the cause of your weakness, your caregiver will take your history and perform a physical exam. Lab tests or X-rays may also be ordered, if needed.    TREATMENT  Treatment of weakness depends on the cause of your symptoms and can vary greatly.    HOME CARE INSTRUCTIONS  Rest as needed.  Eat a well-balanced diet.  Try to get some exercise every day.  Only take over-the-counter or prescription medicines as directed by your caregiver.    SEEK MEDICAL CARE IF:  Your weakness seems to be getting worse or spreads to other parts of your body.  You develop new aches or pains.    SEEK IMMEDIATE MEDICAL CARE IF:  You cannot perform your normal daily activities, such as getting dressed and feeding yourself.  You cannot walk up and down stairs, or you feel exhausted when you do so.  You have shortness of breath or chest pain.  You have difficulty moving parts of your body.   You have weakness in only one area of the body or on only one side of the body.  You have a fever.  You have trouble speaking or swallowing.  You cannot control your bladder or bowel movements.  You have black or bloody vomit or stools.    MAKE SURE YOU:  Understand these instructions.  Will watch your condition.  Will get help right away if you are not doing well or get worse.    ADDITIONAL NOTES AND INSTRUCTIONS    Please follow up with your Primary MD in 24-48 hr.  Seek immediate medical care for any new/worsening signs or symptoms.

## 2021-06-27 NOTE — ED PROVIDER NOTE - CARE PROVIDER_API CALL
Shan Law  CARDIOVASCULAR DISEASE  [address] [place] [address]  Phone: [phone]  Fax: [phone]  Follow Up Time:     Jacque Burrell  INTERNAL MEDICINE  [address] [place] [address]  Phone: [phone]  Fax: [phone]  Follow Up Time:

## 2021-06-27 NOTE — ED PROVIDER NOTE - NS ED ROS FT
Constitutional: + gen weakness/fatigue. no fever, chills  Eyes: no redness/discharge/pain/vision changes  ENT: no rhinorrhea/ear pain/sore throat  Cardiac: + cp. No edema.  Respiratory: No cough or respiratory distress  GI: + nausea. No vomiting, diarrhea, abd pain.   No dysuria, frequency, urgency or hematuria  MS: no pain to back or extremities, no loss of ROM, no weakness  Neuro: No headache or weakness. No LOC.  Skin: No skin rash.  Endocrine: No history of thyroid disease or diabetes.  Except as documented in the HPI, all other systems are negative.

## 2021-06-27 NOTE — ED ADULT NURSE NOTE - NSFALLRSKINDICATORS_ED_ALL_ED
Results for orders placed or performed in visit on 12/04/19   Cardiac EP device report    Narrative    MDT-DUAL CHAMBER PPM (AAIR-DDDR MODE)  CARELINK TRANSMISSION: BATTERY ADEQUATE (11 1 YRS)  AP 32%;  100% (SSS/AAIR-DDDR 60)  ALL LEAD PARAMETERS WITHIN NORMAL LIMITS  NO EPISODES  NORMAL DEVICE FUNCTION   PL
no

## 2021-06-29 ENCOUNTER — EMERGENCY (EMERGENCY)
Facility: HOSPITAL | Age: 26
LOS: 0 days | Discharge: HOME | End: 2021-06-29
Attending: EMERGENCY MEDICINE | Admitting: EMERGENCY MEDICINE
Payer: MEDICAID

## 2021-06-29 VITALS
DIASTOLIC BLOOD PRESSURE: 74 MMHG | TEMPERATURE: 97 F | SYSTOLIC BLOOD PRESSURE: 122 MMHG | OXYGEN SATURATION: 100 % | HEART RATE: 83 BPM | RESPIRATION RATE: 18 BRPM

## 2021-06-29 VITALS — HEIGHT: 67 IN

## 2021-06-29 DIAGNOSIS — R53.1 WEAKNESS: ICD-10-CM

## 2021-06-29 DIAGNOSIS — R11.0 NAUSEA: ICD-10-CM

## 2021-06-29 DIAGNOSIS — I10 ESSENTIAL (PRIMARY) HYPERTENSION: ICD-10-CM

## 2021-06-29 DIAGNOSIS — R55 SYNCOPE AND COLLAPSE: ICD-10-CM

## 2021-06-29 DIAGNOSIS — Z20.822 CONTACT WITH AND (SUSPECTED) EXPOSURE TO COVID-19: ICD-10-CM

## 2021-06-29 DIAGNOSIS — J45.909 UNSPECIFIED ASTHMA, UNCOMPLICATED: ICD-10-CM

## 2021-06-29 LAB
ALBUMIN SERPL ELPH-MCNC: 5 G/DL — SIGNIFICANT CHANGE UP (ref 3.5–5.2)
ALP SERPL-CCNC: 59 U/L — SIGNIFICANT CHANGE UP (ref 30–115)
ALT FLD-CCNC: 35 U/L — SIGNIFICANT CHANGE UP (ref 0–41)
ANION GAP SERPL CALC-SCNC: 15 MMOL/L — HIGH (ref 7–14)
APPEARANCE UR: ABNORMAL
AST SERPL-CCNC: 30 U/L — SIGNIFICANT CHANGE UP (ref 0–41)
BACTERIA # UR AUTO: ABNORMAL
BILIRUB SERPL-MCNC: 1.6 MG/DL — HIGH (ref 0.2–1.2)
BILIRUB UR-MCNC: NEGATIVE — SIGNIFICANT CHANGE UP
BUN SERPL-MCNC: 8 MG/DL — LOW (ref 10–20)
CALCIUM SERPL-MCNC: 10.1 MG/DL — SIGNIFICANT CHANGE UP (ref 8.5–10.1)
CHLORIDE SERPL-SCNC: 101 MMOL/L — SIGNIFICANT CHANGE UP (ref 98–110)
CO2 SERPL-SCNC: 23 MMOL/L — SIGNIFICANT CHANGE UP (ref 17–32)
COLOR SPEC: YELLOW — SIGNIFICANT CHANGE UP
COMMENT - URINE: SIGNIFICANT CHANGE UP
CREAT SERPL-MCNC: 0.8 MG/DL — SIGNIFICANT CHANGE UP (ref 0.7–1.5)
DIFF PNL FLD: NEGATIVE — SIGNIFICANT CHANGE UP
EPI CELLS # UR: 11 /HPF — HIGH (ref 0–5)
GLUCOSE SERPL-MCNC: 78 MG/DL — SIGNIFICANT CHANGE UP (ref 70–99)
GLUCOSE UR QL: NEGATIVE — SIGNIFICANT CHANGE UP
HCT VFR BLD CALC: 40 % — SIGNIFICANT CHANGE UP (ref 37–47)
HGB BLD-MCNC: 13.3 G/DL — SIGNIFICANT CHANGE UP (ref 12–16)
HYALINE CASTS # UR AUTO: 1 /LPF — SIGNIFICANT CHANGE UP (ref 0–7)
KETONES UR-MCNC: ABNORMAL
LEUKOCYTE ESTERASE UR-ACNC: NEGATIVE — SIGNIFICANT CHANGE UP
MCHC RBC-ENTMCNC: 28.3 PG — SIGNIFICANT CHANGE UP (ref 27–31)
MCHC RBC-ENTMCNC: 33.3 G/DL — SIGNIFICANT CHANGE UP (ref 32–37)
MCV RBC AUTO: 85.1 FL — SIGNIFICANT CHANGE UP (ref 81–99)
NITRITE UR-MCNC: NEGATIVE — SIGNIFICANT CHANGE UP
NRBC # BLD: 0 /100 WBCS — SIGNIFICANT CHANGE UP (ref 0–0)
PH UR: 6.5 — SIGNIFICANT CHANGE UP (ref 5–8)
PLATELET # BLD AUTO: 303 K/UL — SIGNIFICANT CHANGE UP (ref 130–400)
POTASSIUM SERPL-MCNC: 4 MMOL/L — SIGNIFICANT CHANGE UP (ref 3.5–5)
POTASSIUM SERPL-SCNC: 4 MMOL/L — SIGNIFICANT CHANGE UP (ref 3.5–5)
PROT SERPL-MCNC: 7.9 G/DL — SIGNIFICANT CHANGE UP (ref 6–8)
PROT UR-MCNC: SIGNIFICANT CHANGE UP
RBC # BLD: 4.7 M/UL — SIGNIFICANT CHANGE UP (ref 4.2–5.4)
RBC # FLD: 13 % — SIGNIFICANT CHANGE UP (ref 11.5–14.5)
RBC CASTS # UR COMP ASSIST: 0 /HPF — SIGNIFICANT CHANGE UP (ref 0–4)
SARS-COV-2 RNA SPEC QL NAA+PROBE: SIGNIFICANT CHANGE UP
SODIUM SERPL-SCNC: 139 MMOL/L — SIGNIFICANT CHANGE UP (ref 135–146)
SP GR SPEC: 1.02 — SIGNIFICANT CHANGE UP (ref 1.01–1.03)
UROBILINOGEN FLD QL: SIGNIFICANT CHANGE UP
WBC # BLD: 14.54 K/UL — HIGH (ref 4.8–10.8)
WBC # FLD AUTO: 14.54 K/UL — HIGH (ref 4.8–10.8)
WBC UR QL: 2 /HPF — SIGNIFICANT CHANGE UP (ref 0–5)

## 2021-06-29 PROCEDURE — 99284 EMERGENCY DEPT VISIT MOD MDM: CPT

## 2021-06-29 PROCEDURE — 93010 ELECTROCARDIOGRAM REPORT: CPT

## 2021-06-29 RX ORDER — SODIUM CHLORIDE 9 MG/ML
1000 INJECTION INTRAMUSCULAR; INTRAVENOUS; SUBCUTANEOUS ONCE
Refills: 0 | Status: COMPLETED | OUTPATIENT
Start: 2021-06-29 | End: 2021-06-29

## 2021-06-29 RX ORDER — ONDANSETRON 8 MG/1
4 TABLET, FILM COATED ORAL ONCE
Refills: 0 | Status: COMPLETED | OUTPATIENT
Start: 2021-06-29 | End: 2021-06-29

## 2021-06-29 RX ADMIN — ONDANSETRON 4 MILLIGRAM(S): 8 TABLET, FILM COATED ORAL at 19:13

## 2021-06-29 RX ADMIN — SODIUM CHLORIDE 1000 MILLILITER(S): 9 INJECTION INTRAMUSCULAR; INTRAVENOUS; SUBCUTANEOUS at 18:00

## 2021-06-29 NOTE — ED PROVIDER NOTE - ATTENDING CONTRIBUTION TO CARE
25F pmh htn, asthma p/w syncope. Was @ an outside UC/Gyn clinic today, while inside had episode of syncope. Was witnessed, denies head trauma. Pt rpts recent increasing generalized weakness & fatigue, was seen in ED twice recently w/similar sx (6/16, 6/27), w/u incl ekg, cxr, labs, cta head/neck, was tx for a RML opacity but did not finish abx bc she could not tolerate, denies every having had fever or cough. Denies dizziness, cp/sob, nvd, abd pain, flank pain, urinary sx, rash, edema. No recent travel/surgery/immobilization/hormone use. Unsure re: history of covid infection, was symptomatic in April but tested neg however her boyfriend tested pos, possible false negative. Recently saw Cardio Dr. Perez for her sx, is scheduled for stress test & echo in Aug 2021. PCP Ashanti.    PE:  nad  skin warm, dry  ncat  neck supple  rrr nl s1s2 no mrg  ctab no wrr  abd soft ntnd no palpable masses no rgr  back non-tender no cvat  ext no cce dpi  neuro aaox3 grossly nf exam

## 2021-06-29 NOTE — ED PROVIDER NOTE - CLINICAL SUMMARY MEDICAL DECISION MAKING FREE TEXT BOX
syncope, recent incr fatigue & generalized weakness - ekg no acute ischemia, cxr from 2d ago nl, labs today nl - all results d/w pt & copies given, pt mentioned poss false neg covid in Apr, suggested outpt Ab testing, as well as thyroid/lyme/autoimmune investigation, strict return precautions discussed, rec outpt pcp/cardio/rheum f/u

## 2021-06-29 NOTE — ED PROVIDER NOTE - CARE PROVIDER_API CALL
Alana Machado)  Rheumatology  [address] [place] [address]  Phone: [phone]  Fax: [phone]  Follow Up Time:    Alana Machado)  Rheumatology  [address] [place] [address]  Phone: [phone]  Fax: [phone]  Follow Up Time:     Jacque Burrell  INTERNAL MEDICINE  [address] [place] [address]  Phone: [phone]  Fax: [phone]  Established Patient  Follow Up Time: 7-10 Days    Nicole Perez  CARDIOVASCULAR DISEASE  [address] [place] [address]  Phone: [phone]  Fax: [phone]  Established Patient  Follow Up Time: Routine

## 2021-06-29 NOTE — ED PROVIDER NOTE - CARE PROVIDERS DIRECT ADDRESSES
,DirectAddress_Unknown ,DirectAddress_Unknown,[email or web address],DirectAddress_Unknown

## 2021-06-29 NOTE — ED PROVIDER NOTE - PATIENT PORTAL LINK FT
You can access the FollowMyHealth Patient Portal offered by [facility] by registering at the following website: [email or web address]. By joining [employer]’s FollowMyHealth portal, you will also be able to view your health information using other applications (apps) compatible with our system.

## 2021-06-29 NOTE — ED PROVIDER NOTE - NS ED ROS FT
Review of Systems:  	•	CONSTITUTIONAL - no fever, no diaphoresis, no chills  	•	SKIN - no rash  	•	HEMATOLOGIC - no bleeding, no bruising  	•	EYES - no eye pain, no blurry vision  	•	ENT - no change in hearing, no sore throat, no ear pain or tinnitus  	•	RESPIRATORY - no shortness of breath, no cough  	•	CARDIAC - no chest pain, no palpitations  	•	GI - no abd pain, no nausea, no vomiting, no diarrhea, no constipation  	•	GENITO-URINARY - no discharge, no dysuria; no hematuria, no increased urinary frequency  	•	MUSCULOSKELETAL - no joint paint, no swelling, no redness  	•	NEUROLOGIC - + weakness, no headache, no paresthesias  	•	PSYCH - no anxiety, non suicidal, non homicidal, no hallucination, no depression

## 2021-06-29 NOTE — ED PROVIDER NOTE - PROVIDER TOKENS
PROVIDER:[TOKEN:[[id number]]] PROVIDER:[TOKEN:[[id number]]],PROVIDER:[TOKEN:[[id number]],FOLLOWUP:[7-10 Days],ESTABLISHEDPATIENT:[T]],PROVIDER:[TOKEN:[[id number]],FOLLOWUP:[Routine],ESTABLISHEDPATIENT:[T]]

## 2021-06-29 NOTE — ED PROVIDER NOTE - OBJECTIVE STATEMENT
25 year old female with no pmhx presents with weakness and fatigue x weeks. Symptoms have been intermittent, seen in ED twice for similar symptoms on 6/16 and 6/27, has labs, ekg, xr, and ct scans which were all normal. Pt admits today she was at gyn clinic and felt lightheaded, and syncopal episode in her chair. no fall or head trauma. pt admits to mild nausea. no fever, chills, chest pain, sob, abd pain, v/d, or urinary symptoms. no rash or weight loss. Pt was Dr amaya lira who is sending her stress test and echo in august.

## 2021-08-03 ENCOUNTER — APPOINTMENT (OUTPATIENT)
Dept: CARDIOLOGY | Facility: CLINIC | Age: 26
End: 2021-08-03

## 2021-10-19 NOTE — ED PROVIDER NOTE - NSFOLLOWUPINSTRUCTIONS_ED_ALL_ED_FT
[facility]-Discharge Summary


Date of Admission:


Date of Admission:  Oct 18, 2021





Date of Discharge:


Date of Discharge:  Oct 19, 2021





Admission Diagnosis:


Problems:  


(1) Intractable nausea and vomiting





Discharge Diagnosis:


Discharge Diagnosis:


Same





Procedures:


Procedures:


CT abdomen pelvis


Impression:





1. Left-sided inguinal canal hernia without evidence of obstruction or 

incarceration.





2. Distended fluid-filled loops of small bowel likely secondary to mild ileus.





Hospital Course:


Hospital Course:


Hx obtained from discussion with the ED physician and chart review:





Patient is a 63-year-old male with past medical history of hypertension, 

bilateral hip replacement and chronic opioid use for his chronic pain who comes 

in to the ED with nausea vomiting that started this morning.  Patient had 

multiple episodes of vomiting and abdominal cramping.  Abdominal pain is in the 

epigastric and lower abdominal area that occurs with nausea vomiting.  Has never

had episodes like this before.  Patient currently denies any fevers, 

hematemesis, melena, chest pain, diarrhea, shortness of breath, or any 

alleviating or exacerbating factors.  Patient is vaccinated for Covid.





10/19


Patient evaluated and examined at bedside.  Reports he was feeling much better 

would like to try diet.  He has been doing well with the diet no nausea vomiting

after.  Would like to discharge at this point which I agree with we will send 

him home with some as needed Bentyl as this seemed to help during his admission.

 Otherwise follow-up with primary care as needed.





Disposition:


Disposition/Orders:  D/C to Home





Activity:


Activity:


Resume previous activity





Diet:


Diet:  Regular





Medications:


Home Meds


Active Scripts


Dicyclomine Hcl (DICYCLOMINE HCL) 10 Mg Capsule, 1 CAP PO PRN Q6HRS for GI 

upset, #60 CAP 2 Refills


   Prov:TANK MENON MD         10/19/21


Benzonatate (TESSALON PERLE) 100 Mg Capsule, 100 MG PO TID PRN for COUGH for 4 

Days, #12 CAP


   Prov:CASTILLO AGUAYO DO         10/9/20


Guaifenesin (MUCINEX) 600 Mg Tablet.er, 1 TAB PO BID for cough for 5 Days, #10 

TAB 0 Refills


   Prov:CASTILLO AGUAYO DO         10/9/20


Lisinopril (LISINOPRIL) 10 Mg Tablet, 10 MG PO DAILY for BP for 30 Days, #30 TAB


   Prov:PEBBLES HORTON MD         3/21/19


Reported Medications


Oxycodone Hcl (OXYCODONE HCL IMMED.RELEASE **) 5 Mg Tablet, 5 MG PO TID for 

pain, TAB 0 Refills


   1/2/20


Diazepam (DIAZEPAM) 10 Mg Tablet, 10 MG PO BID PRN for ANXIETY, TAB


   10/10/18


Trazodone Hcl (TRAZODONE HCL) 100 Mg Tablet, 100 MG PO HS


   12/30/13


Scheduled


Dicyclomine Hcl (Dicyclomine Hcl), 1 CAP PO PRN Q6HRS


Guaifenesin (Mucinex), 1 TAB PO BID


Lisinopril (Lisinopril), 10 MG PO DAILY


Oxycodone Hcl (Oxycodone Hcl Immed.release **), 5 MG PO TID, (Reported)


Trazodone Hcl (Trazodone Hcl), 100 MG PO HS, (Reported)





Scheduled PRN


Benzonatate (Tessalon Perle), 100 MG PO TID PRN for COUGH


Diazepam (Diazepam), 10 MG PO BID PRN for ANXIETY, (Reported)





Justicifation of Admission Dx:


Justifications for Admission:


Justification of Admission Dx:  Yes (Intractable abdominal pain nausea vomiting)











TANK MENON MD         Oct 19, 2021 15:28
Pneumonia    Pneumonia is an infection of the lungs. Pneumonia may be caused by bacteria, viruses, or funguses. Symptoms include coughing, fever, chest pain when breathing deeply or coughing, shortness of breath, fatigue, or muscle aches. Pneumonia can be diagnosed with a medical history and physical exam, as well as other tests which may include a chest X-ray. If you were prescribed an antibiotic medicine, take it as told by your health care provider and do not stop taking the antibiotic even if you start to feel better. Do not use tobacco products, including cigarettes, chewing tobacco, and e-cigarettes.    SEEK IMMEDIATE MEDICAL CARE IF YOU HAVE THE FOLLOWING SYMPTOMS: worsening shortness of breath, worsening chest pain, coughing up blood, change in mental status, lightheadedness/dizziness.

## 2022-03-22 NOTE — ED PROVIDER NOTE - WET READ LAUNCH FT
Sara Oglesby calls back in for his sleep study results. Informed he had 53.7 events an hour. Dr. Carmichael Proper recommended he go back for a second study. Per patient \"I would like to review the full report \" I am a client\". I offered Sara Oglesby an appointment to review with Dr. Andrae Chawla. Declined appointment. \"Once I see the full report and share that, I will call back and let you know what I will do. \"  Gerardo Alcazar we print full report from the lab, mail to his home address. Dr. Andrae Chawla please advise. Would this be the final interpretation result we print from the lab? There are no Wet Read(s) to document.

## 2022-03-29 ENCOUNTER — EMERGENCY (EMERGENCY)
Facility: HOSPITAL | Age: 27
LOS: 0 days | Discharge: HOME | End: 2022-03-29
Attending: EMERGENCY MEDICINE | Admitting: EMERGENCY MEDICINE
Payer: MEDICAID

## 2022-03-29 VITALS
HEIGHT: 67 IN | SYSTOLIC BLOOD PRESSURE: 137 MMHG | DIASTOLIC BLOOD PRESSURE: 97 MMHG | TEMPERATURE: 97 F | OXYGEN SATURATION: 100 % | WEIGHT: 229.94 LBS | HEART RATE: 88 BPM | RESPIRATION RATE: 18 BRPM
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
## 2022-03-29 DIAGNOSIS — M25.571 PAIN IN RIGHT ANKLE AND JOINTS OF RIGHT FOOT: ICD-10-CM
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
## 2022-03-29 DIAGNOSIS — I10 ESSENTIAL (PRIMARY) HYPERTENSION: ICD-10-CM

## 2022-03-29 DIAGNOSIS — F41.9 ANXIETY DISORDER, UNSPECIFIED: ICD-10-CM
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
## 2022-03-29 PROCEDURE — 29515 APPLICATION SHORT LEG SPLINT: CPT

## 2022-03-29 PROCEDURE — 73610 X-RAY EXAM OF ANKLE: CPT | Mod: 26,RT

## 2022-03-29 PROCEDURE — 99283 EMERGENCY DEPT VISIT LOW MDM: CPT | Mod: 25

## 2022-03-29 PROCEDURE — 73620 X-RAY EXAM OF FOOT: CPT | Mod: 26,RT

## 2022-03-29 RX ORDER — ESCITALOPRAM OXALATE 10 MG/1
1 TABLET, FILM COATED ORAL
Qty: 0 | Refills: 0 | DISCHARGE

## 2022-03-29 RX ORDER — OLMESARTAN MEDOXOMIL 5 MG/1
10 TABLET, FILM COATED ORAL
Qty: 0 | Refills: 0 | DISCHARGE

## 2022-03-29 RX ORDER — BENAZEPRIL HYDROCHLORIDE 40 MG/1
1 TABLET ORAL
Qty: 0 | Refills: 0 | DISCHARGE

## 2022-03-29 NOTE — ED PROVIDER NOTE - NSFOLLOWUPINSTRUCTIONS_ED_ALL_ED_FT
Foot Pain      Many things can cause foot pain. Some common causes are:  •An injury.      •A sprain.      •Arthritis.      •Blisters.      •Bunions.        Follow these instructions at home:      Managing pain, stiffness, and swelling      If directed, put ice on the painful area:  •Put ice in a plastic bag.      •Place a towel between your skin and the bag.      •Leave the ice on for 20 minutes, 2–3 times a day.      Activity     • Do not stand or walk for long periods.      •Return to your normal activities as told by your health care provider. Ask your health care provider what activities are safe for you.      •Do stretches to relieve foot pain and stiffness as told by your health care provider.      • Do not lift anything that is heavier than 10 lb (4.5 kg), or the limit that you are told, until your health care provider says that it is safe. Lifting a lot of weight can put added pressure on your feet.      Lifestyle     •Wear comfortable, supportive shoes that fit you well. Do not wear high heels.      •Keep your feet clean and dry.      General instructions     •Take over-the-counter and prescription medicines only as told by your health care provider.      •Rub your foot gently.      •Pay attention to any changes in your symptoms.      •Keep all follow-up visits as told by your health care provider. This is important.        Contact a health care provider if:    •Your pain does not get better after a few days of self-care.      •Your pain gets worse.      •You cannot stand on your foot.        Get help right away if:    •Your foot is numb or tingling.      •Your foot or toes are swollen.      •Your foot or toes turn white or blue.      •You have warmth and redness along your foot.        Summary    •Common causes of foot pain are injury, sprain, arthritis, blisters or bunions.      •Ice, medicines, and comfortable shoes may help foot pain.      •Contact your health care provider if your pain does not get better after a few days of self-care.      This information is not intended to replace advice given to you by your health care provider. Make sure you discuss any questions you have with your health care provider.

## 2022-03-29 NOTE — ED PROVIDER NOTE - PHYSICAL EXAMINATION
CONSTITUTIONAL: NAD  SKIN: Warm dry  HEAD: NCAT  EYES: NL inspection  ENT: MMM  NECK: Supple; non tender.  CARD: RRR  RESP: CTAB  ABD: S/NT no R/G  EXT: Rt foot ttp, lateral dorsally. NV intact  NEURO: Grossly unremarkable  PSYCH: Cooperative, appropriate.

## 2022-03-29 NOTE — ED PROVIDER NOTE - ATTENDING CONTRIBUTION TO CARE
I was present for and supervised the key and critical aspects of the procedures performed during the care of the patient. patient presents for evaluation of foot pain after stepping into a drain its moderate and throbbing in nature worse with walking   on exam patient has pain to medial and lateral mal with pain to navicular region we obtained xrays which are negative but based on clinical exam placed in splint with follow up to ortho

## 2022-03-29 NOTE — ED PROVIDER NOTE - CARE PROVIDER_API CALL
Jose Orozco)  Orthopaedic Surgery  [address] [place] [address]  Phone: [phone]  Fax: [phone]  Follow Up Time: 1-3 Days

## 2022-03-29 NOTE — ED PROCEDURE NOTE - ATTENDING CONTRIBUTION TO CARE
I was present for and supervised the key and critical aspects of the procedures performed during the care of the patient. Patient placed in splint

## 2022-03-29 NOTE — ED PROVIDER NOTE - OBJECTIVE STATEMENT
26 y f, no pmh, pw rt foot pain. Endorsed walking and having rt foot stuck in a drain, fell forward, +ft pain after, lateral dorsal foot, able to bear weight. No pain injury elsewhere.

## 2022-03-29 NOTE — ED PROVIDER NOTE - PATIENT PORTAL LINK FT
You can access the FollowMyHealth Patient Portal offered by [facility] by registering at the following website: [email or web address]. By joining [employer]’s FollowMyHealth portal, you will also be able to view your health information using other applications (apps) compatible with our system.

## 2022-03-29 NOTE — ED PROVIDER NOTE - CLINICAL SUMMARY MEDICAL DECISION MAKING FREE TEXT BOX
patient presents for evaluation of foot pain after stepping into a drain its moderate and throbbing in nature worse with walking   on exam patient has pain to medial and lateral mal with pain to navicular region we obtained xrays which are negative but based on clinical exam placed in splint with follow up to ortho

## 2022-03-29 NOTE — ED ADULT NURSE NOTE - NSIMPLEMENTINTERV_GEN_ALL_ED
Implemented All Fall Risk Interventions:  [employer] to call system. Call bell, personal items and telephone within reach. Instruct patient to call for assistance. Room bathroom lighting operational. Non-slip footwear when patient is off stretcher. Physically safe environment: no spills, clutter or unnecessary equipment. Stretcher in lowest position, wheels locked, appropriate side rails in place. Provide visual cue, wrist band, yellow gown, etc. Monitor gait and stability. Monitor for mental status changes and reorient to person, place, and time. Review medications for side effects contributing to fall risk. Reinforce activity limits and safety measures with patient and family.

## 2022-05-21 ENCOUNTER — NON-APPOINTMENT (OUTPATIENT)
Age: 27
End: 2022-05-21

## 2022-06-20 ENCOUNTER — NON-APPOINTMENT (OUTPATIENT)
Age: 27
End: 2022-06-20

## 2022-11-17 ENCOUNTER — NON-APPOINTMENT (OUTPATIENT)
Age: 27
End: 2022-11-17

## 2023-10-13 ENCOUNTER — NON-APPOINTMENT (OUTPATIENT)
Age: 28
End: 2023-10-13

## 2023-10-17 PROBLEM — F41.9 ANXIETY DISORDER, UNSPECIFIED: Chronic | Status: ACTIVE | Noted: 2022-03-29

## 2023-10-23 ENCOUNTER — APPOINTMENT (OUTPATIENT)
Dept: OBGYN | Facility: CLINIC | Age: 28
End: 2023-10-23

## 2024-06-03 ENCOUNTER — APPOINTMENT (OUTPATIENT)
Dept: OBGYN | Facility: CLINIC | Age: 29
End: 2024-06-03

## 2024-06-20 NOTE — ED ADULT NURSE NOTE - HOW MANY DRINKS CONTAINING ALCOHOL DO YOU HAVE ON A TYPICAL DAY WHEN YOU ARE DRINKING?
Pt arrived for Aranesp injection .  Pt continues to have increased fatigue .  Vials obtained   Labs reviewed .  Informed MD of concerns  Informed pt to  increase fluids and rest as needed. .   Aranesp injection tolerated well ,band aide applied to site .  Pt discharged    
1 or 2

## 2024-10-08 ENCOUNTER — EMERGENCY (EMERGENCY)
Facility: HOSPITAL | Age: 29
LOS: 0 days | Discharge: HOME | End: 2024-10-08
Attending: STUDENT IN AN ORGANIZED HEALTH CARE EDUCATION/TRAINING PROGRAM
Payer: MEDICAID

## 2024-10-08 VITALS
WEIGHT: 210.1 LBS | TEMPERATURE: 98 F | RESPIRATION RATE: 18 BRPM | SYSTOLIC BLOOD PRESSURE: 147 MMHG | DIASTOLIC BLOOD PRESSURE: 96 MMHG | OXYGEN SATURATION: 99 % | HEART RATE: 88 BPM

## 2024-10-08 DIAGNOSIS — I10 ESSENTIAL (PRIMARY) HYPERTENSION: ICD-10-CM

## 2024-10-08 DIAGNOSIS — Z79.85 LONG-TERM (CURRENT) USE OF INJECTABLE NON-INSULIN ANTIDIABETIC DRUGS: ICD-10-CM

## 2024-10-08 DIAGNOSIS — R53.83 OTHER FATIGUE: ICD-10-CM

## 2024-10-08 DIAGNOSIS — F41.9 ANXIETY DISORDER, UNSPECIFIED: ICD-10-CM

## 2024-10-08 DIAGNOSIS — R11.0 NAUSEA: ICD-10-CM

## 2024-10-08 LAB
ALBUMIN SERPL ELPH-MCNC: 4.5 G/DL — SIGNIFICANT CHANGE UP (ref 3.5–5.2)
ALP SERPL-CCNC: 48 U/L — SIGNIFICANT CHANGE UP (ref 30–115)
ALT FLD-CCNC: 8 U/L — SIGNIFICANT CHANGE UP (ref 0–41)
ANION GAP SERPL CALC-SCNC: 10 MMOL/L — SIGNIFICANT CHANGE UP (ref 7–14)
AST SERPL-CCNC: 13 U/L — SIGNIFICANT CHANGE UP (ref 0–41)
BASOPHILS # BLD AUTO: 0.01 K/UL — SIGNIFICANT CHANGE UP (ref 0–0.2)
BASOPHILS NFR BLD AUTO: 0.1 % — SIGNIFICANT CHANGE UP (ref 0–1)
BILIRUB SERPL-MCNC: 1.2 MG/DL — SIGNIFICANT CHANGE UP (ref 0.2–1.2)
BUN SERPL-MCNC: 9 MG/DL — LOW (ref 10–20)
CALCIUM SERPL-MCNC: 9.8 MG/DL — SIGNIFICANT CHANGE UP (ref 8.4–10.5)
CHLORIDE SERPL-SCNC: 101 MMOL/L — SIGNIFICANT CHANGE UP (ref 98–110)
CO2 SERPL-SCNC: 27 MMOL/L — SIGNIFICANT CHANGE UP (ref 17–32)
CREAT SERPL-MCNC: 0.9 MG/DL — SIGNIFICANT CHANGE UP (ref 0.7–1.5)
EGFR: 89 ML/MIN/1.73M2 — SIGNIFICANT CHANGE UP
EOSINOPHIL # BLD AUTO: 0.07 K/UL — SIGNIFICANT CHANGE UP (ref 0–0.7)
EOSINOPHIL NFR BLD AUTO: 0.9 % — SIGNIFICANT CHANGE UP (ref 0–8)
FLUAV AG NPH QL: SIGNIFICANT CHANGE UP
FLUBV AG NPH QL: SIGNIFICANT CHANGE UP
GLUCOSE SERPL-MCNC: 93 MG/DL — SIGNIFICANT CHANGE UP (ref 70–99)
HCG SERPL QL: NEGATIVE — SIGNIFICANT CHANGE UP
HCT VFR BLD CALC: 38.7 % — SIGNIFICANT CHANGE UP (ref 37–47)
HGB BLD-MCNC: 12.8 G/DL — SIGNIFICANT CHANGE UP (ref 12–16)
IMM GRANULOCYTES NFR BLD AUTO: 0.4 % — HIGH (ref 0.1–0.3)
LYMPHOCYTES # BLD AUTO: 2.06 K/UL — SIGNIFICANT CHANGE UP (ref 1.2–3.4)
LYMPHOCYTES # BLD AUTO: 26.4 % — SIGNIFICANT CHANGE UP (ref 20.5–51.1)
MCHC RBC-ENTMCNC: 28.4 PG — SIGNIFICANT CHANGE UP (ref 27–31)
MCHC RBC-ENTMCNC: 33.1 G/DL — SIGNIFICANT CHANGE UP (ref 32–37)
MCV RBC AUTO: 85.8 FL — SIGNIFICANT CHANGE UP (ref 81–99)
MONOCYTES # BLD AUTO: 0.43 K/UL — SIGNIFICANT CHANGE UP (ref 0.1–0.6)
MONOCYTES NFR BLD AUTO: 5.5 % — SIGNIFICANT CHANGE UP (ref 1.7–9.3)
NEUTROPHILS # BLD AUTO: 5.19 K/UL — SIGNIFICANT CHANGE UP (ref 1.4–6.5)
NEUTROPHILS NFR BLD AUTO: 66.7 % — SIGNIFICANT CHANGE UP (ref 42.2–75.2)
NRBC # BLD: 0 /100 WBCS — SIGNIFICANT CHANGE UP (ref 0–0)
PLATELET # BLD AUTO: 281 K/UL — SIGNIFICANT CHANGE UP (ref 130–400)
PMV BLD: 10.5 FL — HIGH (ref 7.4–10.4)
POTASSIUM SERPL-MCNC: 3.5 MMOL/L — SIGNIFICANT CHANGE UP (ref 3.5–5)
POTASSIUM SERPL-SCNC: 3.5 MMOL/L — SIGNIFICANT CHANGE UP (ref 3.5–5)
PROT SERPL-MCNC: 7.4 G/DL — SIGNIFICANT CHANGE UP (ref 6–8)
RBC # BLD: 4.51 M/UL — SIGNIFICANT CHANGE UP (ref 4.2–5.4)
RBC # FLD: 13 % — SIGNIFICANT CHANGE UP (ref 11.5–14.5)
RSV RNA NPH QL NAA+NON-PROBE: SIGNIFICANT CHANGE UP
SARS-COV-2 RNA SPEC QL NAA+PROBE: SIGNIFICANT CHANGE UP
SODIUM SERPL-SCNC: 138 MMOL/L — SIGNIFICANT CHANGE UP (ref 135–146)
WBC # BLD: 7.79 K/UL — SIGNIFICANT CHANGE UP (ref 4.8–10.8)
WBC # FLD AUTO: 7.79 K/UL — SIGNIFICANT CHANGE UP (ref 4.8–10.8)

## 2024-10-08 PROCEDURE — 36415 COLL VENOUS BLD VENIPUNCTURE: CPT

## 2024-10-08 PROCEDURE — 99284 EMERGENCY DEPT VISIT MOD MDM: CPT

## 2024-10-08 PROCEDURE — 85025 COMPLETE CBC W/AUTO DIFF WBC: CPT

## 2024-10-08 PROCEDURE — 99284 EMERGENCY DEPT VISIT MOD MDM: CPT | Mod: 25

## 2024-10-08 PROCEDURE — 80053 COMPREHEN METABOLIC PANEL: CPT

## 2024-10-08 PROCEDURE — 96374 THER/PROPH/DIAG INJ IV PUSH: CPT

## 2024-10-08 PROCEDURE — 84703 CHORIONIC GONADOTROPIN ASSAY: CPT

## 2024-10-08 PROCEDURE — 0241U: CPT

## 2024-10-08 RX ORDER — ONDANSETRON HCL/PF 4 MG/2 ML
4 VIAL (ML) INJECTION ONCE
Refills: 0 | Status: COMPLETED | OUTPATIENT
Start: 2024-10-08 | End: 2024-10-08

## 2024-10-08 RX ORDER — SODIUM CHLORIDE 0.9 % (FLUSH) 0.9 %
1000 SYRINGE (ML) INJECTION ONCE
Refills: 0 | Status: COMPLETED | OUTPATIENT
Start: 2024-10-08 | End: 2024-10-08

## 2024-10-08 RX ADMIN — Medication 1000 MILLILITER(S): at 08:42

## 2024-10-08 RX ADMIN — Medication 4 MILLIGRAM(S): at 08:42

## 2024-10-08 NOTE — ED PROVIDER NOTE - PATIENT PORTAL LINK FT
You can access the FollowMyHealth Patient Portal offered by [facility] by registering at the following website: [email or web address]. By joining [employer]’s FollowMyHealth portal, you will also be able to view your health information using other applications (apps) compatible with our system.

## 2024-10-08 NOTE — ED PROVIDER NOTE - PROGRESS NOTE DETAILS
MS–on reevaluation patient feeling better.  Labs discussed.  Plan for discharge home.  Told patient she must follow-up with her primary care physician.

## 2024-10-08 NOTE — ED PROVIDER NOTE - CLINICAL SUMMARY MEDICAL DECISION MAKING FREE TEXT BOX
28-year-old female history of hypertension, anxiety on Lexapro, Mounjaro for weight loss presenting with generalized nausea and fatigue x 5 days which worsened after she went on a plane for the first time 4 days ago.  Describes this as a "anxiety provoking experience".  Generally is felt unwell today and shaky.  No other associated symptoms.    On exam, vitals reviewed.  Agree with exam as documented by resident above.    No abdominal tenderness to suggest Shefali lithiasis, cholecystitis, nephrolithiasis, no urinary symptoms or CVA tenderness to suggest pyelonephritis or cystitis.  No vomiting or abdominal tenderness to suggest pancreatitis.  No respiratory symptoms to suggest a viral URI.  Age and character not consistent with ACS.    Labs were sent which were unremarkable.  COVID-negative.  Serum pregnancy negative.  Patient was given fluids and Zofran.  Feels somewhat improved.  Tolerating p.o.  Suspect her symptoms could be related to an anxiety exacerbation.  Recommended she speak with her prescribing doctor for reconsideration of her dosage.  Also advised consideration of starting therapy.  All questions answered.  Return precautions discussed.  Patient verbalized understanding of the plan

## 2024-10-08 NOTE — ED PROVIDER NOTE - OBJECTIVE STATEMENT
Patient is a 28-year-old female past medical history of hypertension (no longer on medication), anxiety on Lexapro 20 mg daily, on Mounjaro for weight loss, presenting for evaluation of 5 days of generalized nausea and fatigue.  Patient states that she went on a plane for the first time 4 days ago and it was an anxiety provoking experience.  Patient states that since then she has been feeling generally unwell and shaky.  She denies fevers, chills, sick contacts, abdominal pain, vomiting, chest pain, shortness of breath, dizziness.

## 2024-10-08 NOTE — ED PROVIDER NOTE - PHYSICAL EXAMINATION
VITAL SIGNS: I have reviewed nursing notes and confirm.  CONSTITUTIONAL: well-appearing, non-toxic, NAD  SKIN: Warm dry, normal skin turgor  HEAD: NCAT  EYES: EOMI, PERRLA, no scleral icterus  ENT: Moist mucous membranes, normal pharynx with no erythema or exudates  NECK: Supple; non tender. Full ROM. No cervical LAD  CARD: RRR, no murmurs, rubs or gallops  RESP: clear to ausculation b/l.  No rales, rhonchi, or wheezing.  ABD: soft,  non-tender, non-distended, no rebound or guarding. No CVA tenderness  EXT: Full ROM, no bony tenderness, no pedal edema, no calf tenderness  NEURO: normal motor. normal sensory. CN II-XII intact. Cerebellar testing normal. Normal gait.  PSYCH: Cooperative, appropriate.

## 2025-01-07 ENCOUNTER — APPOINTMENT (OUTPATIENT)
Dept: OBGYN | Facility: CLINIC | Age: 30
End: 2025-01-07
Payer: MEDICAID

## 2025-01-07 ENCOUNTER — TRANSCRIPTION ENCOUNTER (OUTPATIENT)
Age: 30
End: 2025-01-07

## 2025-01-07 VITALS
SYSTOLIC BLOOD PRESSURE: 122 MMHG | WEIGHT: 192 LBS | BODY MASS INDEX: 30.13 KG/M2 | HEIGHT: 67 IN | DIASTOLIC BLOOD PRESSURE: 84 MMHG

## 2025-01-07 DIAGNOSIS — Z01.419 ENCOUNTER FOR GYNECOLOGICAL EXAMINATION (GENERAL) (ROUTINE) W/OUT ABNORMAL FINDINGS: ICD-10-CM

## 2025-01-07 DIAGNOSIS — Z30.09 ENCOUNTER FOR OTHER GENERAL COUNSELING AND ADVICE ON CONTRACEPTION: ICD-10-CM

## 2025-01-07 PROCEDURE — 99385 PREV VISIT NEW AGE 18-39: CPT | Mod: 25

## 2025-01-07 PROCEDURE — 99459 PELVIC EXAMINATION: CPT

## 2025-01-07 RX ORDER — ESCITALOPRAM OXALATE 5 MG/1
TABLET, FILM COATED ORAL
Refills: 0 | Status: ACTIVE | COMMUNITY

## 2025-01-17 ENCOUNTER — APPOINTMENT (OUTPATIENT)
Dept: ORTHOPEDIC SURGERY | Facility: CLINIC | Age: 30
End: 2025-01-17

## 2025-01-17 DIAGNOSIS — S76.011A STRAIN OF MUSCLE, FASCIA AND TENDON OF RIGHT HIP, INITIAL ENCOUNTER: ICD-10-CM

## 2025-01-17 PROCEDURE — 99203 OFFICE O/P NEW LOW 30 MIN: CPT

## 2025-01-17 PROCEDURE — 73503 X-RAY EXAM HIP UNI 4/> VIEWS: CPT | Mod: RT

## 2025-02-28 ENCOUNTER — APPOINTMENT (OUTPATIENT)
Facility: CLINIC | Age: 30
End: 2025-02-28

## 2025-03-21 ENCOUNTER — NON-APPOINTMENT (OUTPATIENT)
Age: 30
End: 2025-03-21

## 2025-03-27 ENCOUNTER — NON-APPOINTMENT (OUTPATIENT)
Age: 30
End: 2025-03-27

## 2025-03-27 ENCOUNTER — APPOINTMENT (OUTPATIENT)
Dept: OBGYN | Facility: CLINIC | Age: 30
End: 2025-03-27
Payer: MEDICAID

## 2025-03-27 VITALS
HEIGHT: 67 IN | SYSTOLIC BLOOD PRESSURE: 101 MMHG | WEIGHT: 192 LBS | BODY MASS INDEX: 30.13 KG/M2 | DIASTOLIC BLOOD PRESSURE: 84 MMHG

## 2025-03-27 DIAGNOSIS — N89.8 OTHER SPECIFIED NONINFLAMMATORY DISORDERS OF VAGINA: ICD-10-CM

## 2025-03-27 DIAGNOSIS — B37.31 ACUTE CANDIDIASIS OF VULVA AND VAGINA: ICD-10-CM

## 2025-03-27 DIAGNOSIS — N76.2 ACUTE VULVITIS: ICD-10-CM

## 2025-03-27 PROCEDURE — 99459 PELVIC EXAMINATION: CPT

## 2025-03-27 PROCEDURE — 99214 OFFICE O/P EST MOD 30 MIN: CPT | Mod: 25

## 2025-03-27 RX ORDER — TERCONAZOLE 4 MG/G
0.4 CREAM VAGINAL
Qty: 1 | Refills: 1 | Status: ACTIVE | COMMUNITY
Start: 2025-03-27 | End: 1900-01-01

## 2025-03-27 RX ORDER — CLOTRIMAZOLE AND BETAMETHASONE DIPROPIONATE 10; .5 MG/G; MG/G
1-0.05 CREAM TOPICAL TWICE DAILY
Qty: 1 | Refills: 0 | Status: ACTIVE | COMMUNITY
Start: 2025-03-27 | End: 1900-01-01

## 2025-04-03 NOTE — OB RN DELIVERY SUMMARY - NS_[facility]_OBGYN_ALL_OB
Patient seen in Urgent Care today by Eduarda Benedict NP for Abdominal Pain. Felipe SINGH on call, Dr. Adarsh Cline, consulted and disposition is for patient to be seen in the emergency department. Patient directed to [facility] ED. Follow up scheduled on 04/04/2025 to outreach the patient to assess for any additional needs/concerns following ED visit.      [place]

## 2025-04-04 ENCOUNTER — APPOINTMENT (OUTPATIENT)
Dept: OBGYN | Facility: CLINIC | Age: 30
End: 2025-04-04

## 2025-04-15 ENCOUNTER — NON-APPOINTMENT (OUTPATIENT)
Age: 30
End: 2025-04-15

## 2025-04-16 ENCOUNTER — OUTPATIENT (OUTPATIENT)
Dept: OUTPATIENT SERVICES | Facility: HOSPITAL | Age: 30
LOS: 1 days | End: 2025-04-16

## 2025-04-16 DIAGNOSIS — K02.9 DENTAL CARIES, UNSPECIFIED: ICD-10-CM

## 2025-04-16 PROCEDURE — D0220: CPT

## 2025-04-16 PROCEDURE — D7140: CPT

## 2025-04-16 PROCEDURE — D0140: CPT

## 2025-04-17 DIAGNOSIS — K02.9 DENTAL CARIES, UNSPECIFIED: ICD-10-CM

## 2025-04-29 PROBLEM — N94.6 DYSMENORRHEA: Status: ACTIVE | Noted: 2025-04-29

## 2025-08-21 ENCOUNTER — APPOINTMENT (OUTPATIENT)
Dept: OBGYN | Facility: CLINIC | Age: 30
End: 2025-08-21

## 2025-08-21 VITALS — HEIGHT: 68 IN | WEIGHT: 175 LBS | BODY MASS INDEX: 26.52 KG/M2

## 2025-08-21 DIAGNOSIS — Z30.430 ENCOUNTER FOR INSERTION OF INTRAUTERINE CONTRACEPTIVE DEVICE: ICD-10-CM

## 2025-08-21 LAB
APPEARANCE: CLEAR
BILIRUBIN URINE: NEGATIVE
BLOOD URINE: NEGATIVE
COLOR: YELLOW
GLUCOSE QUALITATIVE U: NEGATIVE
KETONES URINE: NEGATIVE
LEUKOCYTE ESTERASE URINE: ABNORMAL
NITRITE URINE: NEGATIVE
PH URINE: 6
PROTEIN URINE: NEGATIVE
SPECIFIC GRAVITY URINE: 1.02
UROBILINOGEN URINE: 0.2 (ref 0.2–?)

## 2025-08-21 PROCEDURE — 58300 INSERT INTRAUTERINE DEVICE: CPT

## 2025-08-21 PROCEDURE — 76830 TRANSVAGINAL US NON-OB: CPT

## 2025-09-05 ENCOUNTER — NON-APPOINTMENT (OUTPATIENT)
Age: 30
End: 2025-09-05